# Patient Record
Sex: FEMALE | Race: WHITE | NOT HISPANIC OR LATINO | Employment: FULL TIME
[De-identification: names, ages, dates, MRNs, and addresses within clinical notes are randomized per-mention and may not be internally consistent; named-entity substitution may affect disease eponyms.]

---

## 2018-04-02 ENCOUNTER — TRANSCRIBE ORDERS (OUTPATIENT)
Dept: SCHEDULING | Age: 43
End: 2018-04-02

## 2018-04-02 DIAGNOSIS — Z12.39 SCREENING BREAST EXAMINATION: ICD-10-CM

## 2018-04-02 DIAGNOSIS — R92.30 BREAST DENSITY: Primary | ICD-10-CM

## 2018-04-02 DIAGNOSIS — R92.1 BREAST CALCIFICATION SEEN ON MAMMOGRAM: ICD-10-CM

## 2018-04-06 ENCOUNTER — TELEPHONE (OUTPATIENT)
Dept: FAMILY MEDICINE | Facility: CLINIC | Age: 43
End: 2018-04-06

## 2018-04-06 NOTE — TELEPHONE ENCOUNTER
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  Patient now on proton pump inhibitor and Diflucan.  She is concerned the proton pump inhibitor may have contributed to thrush.  She may give trial of Zantac twice a day and hold the proton pump inhibitor until she sees gastroenterology.  After seeing gastroenterology would check lab work to check immune status.  Patient agrees

## 2018-04-06 NOTE — TELEPHONE ENCOUNTER
Pt had recent endoscopy done and would like to discuss the results with you.   Pt requesting callback  on # [phone]    ** Pt would like to discuss with Dr. Ordoñez

## 2018-04-16 ENCOUNTER — HOSPITAL ENCOUNTER (OUTPATIENT)
Dept: RADIOLOGY | Age: 43
Discharge: HOME | End: 2018-04-16
Attending: OBSTETRICS & GYNECOLOGY
Payer: COMMERCIAL

## 2018-04-16 DIAGNOSIS — Z12.39 SCREENING BREAST EXAMINATION: ICD-10-CM

## 2018-04-16 DIAGNOSIS — R92.30 BREAST DENSITY: ICD-10-CM

## 2018-04-16 DIAGNOSIS — R92.1 BREAST CALCIFICATION SEEN ON MAMMOGRAM: ICD-10-CM

## 2018-04-16 PROCEDURE — 77066 DX MAMMO INCL CAD BI: CPT

## 2018-06-11 ENCOUNTER — TELEPHONE (OUTPATIENT)
Dept: FAMILY MEDICINE | Facility: CLINIC | Age: 43
End: 2018-06-11

## 2018-06-11 NOTE — TELEPHONE ENCOUNTER
RE STOMACH ISSUES/RECURRING ESOPHAGEAL INFECTION.  GI SUGGESTS SEEING AN INFECTIOUS DISEASE SPECIALIST. SHE WOULD LIKE TO KNOW YOUR THOUGHTS O THAT AND WHO YOU WOULD SUGGEST IF YOU AGREE THAT THIS IS THE BEST COURSE OF ACTION  BEST NUMBER:  [phone]

## 2018-06-13 ENCOUNTER — TELEPHONE (OUTPATIENT)
Dept: FAMILY MEDICINE | Facility: CLINIC | Age: 43
End: 2018-06-13

## 2018-06-13 NOTE — TELEPHONE ENCOUNTER
Pt has gi issues and recently had esophagitis.  Was advised b gi to see infectious disease.  Gave pt names at [facility].  Pt would still like to speak to you to see if you think she should go/mb

## 2018-06-15 NOTE — TELEPHONE ENCOUNTER
Agree with referral to infectious disease for recurrent candidal esophagitis.  Patient understands

## 2018-12-17 ENCOUNTER — OFFICE VISIT (OUTPATIENT)
Dept: FAMILY MEDICINE | Facility: CLINIC | Age: 43
End: 2018-12-17
Payer: COMMERCIAL

## 2018-12-17 VITALS
OXYGEN SATURATION: 98 % | RESPIRATION RATE: 12 BRPM | TEMPERATURE: 97.8 F | HEART RATE: 68 BPM | WEIGHT: 113.4 LBS | SYSTOLIC BLOOD PRESSURE: 126 MMHG | BODY MASS INDEX: 21.41 KG/M2 | HEIGHT: 61 IN | DIASTOLIC BLOOD PRESSURE: 82 MMHG

## 2018-12-17 DIAGNOSIS — R10.9 ABDOMINAL DISCOMFORT: Primary | ICD-10-CM

## 2018-12-17 PROCEDURE — 99213 OFFICE O/P EST LOW 20 MIN: CPT | Performed by: NURSE PRACTITIONER

## 2018-12-17 RX ORDER — VALACYCLOVIR HYDROCHLORIDE 1 G/1
TABLET, FILM COATED ORAL
COMMUNITY
Start: 2017-02-21 | End: 2019-02-20 | Stop reason: SDUPTHER

## 2018-12-17 RX ORDER — FEXOFENADINE HCL AND PSEUDOEPHEDRINE HCI 60; 120 MG/1; MG/1
TABLET, EXTENDED RELEASE ORAL
COMMUNITY
Start: 2016-04-05 | End: 2021-01-15

## 2018-12-17 RX ORDER — PANTOPRAZOLE SODIUM 40 MG/1
40 TABLET, DELAYED RELEASE ORAL
COMMUNITY
Start: 2017-02-07 | End: 2020-12-10 | Stop reason: ALTCHOICE

## 2018-12-17 ASSESSMENT — ENCOUNTER SYMPTOMS
CONSTIPATION: 0
CONSTITUTIONAL NEGATIVE: 1
HEMATURIA: 0
VOMITING: 0
DYSURIA: 0
ABDOMINAL PAIN: 1
FREQUENCY: 0
ROS GI COMMENTS: NO CHANGE IN APPETITE
DIARRHEA: 0
NAUSEA: 0

## 2018-12-17 NOTE — PROGRESS NOTES
Daily Progress Note      Subjective      Patient ID: Sandra Albrecht is a 43 y.o. female.    Past two days Notes slight lower abdominal discomfort off and on (no relation to urinating) and some slight burning? Has had UTIs in the past and doesn't note frequency, urgency or james dysuria, but wants checked-in past had progressed to pyelonephritis and doesn't want that to happen again  Denies fever, back pain, hematuria          The following have been reviewed and updated as appropriate in this visit:       Review of Systems   Constitutional: Negative.    Gastrointestinal: Positive for abdominal pain (mild lower abdominal discomfort wax and wane). Negative for constipation, diarrhea, nausea and vomiting.        No change in appetite   Genitourinary: Negative for dysuria, frequency, genital sores, hematuria, urgency, vaginal discharge and vaginal pain.        Notes slight lower abdominal discomfort which comes and goes w/o relation to urination  No flank pain       Current Outpatient Prescriptions   Medication Sig Dispense Refill   • fexofenadine-pseudoephedrine (ALLEGRA-D 12 HOUR)  mg per 12 hr tablet take 1 tablet by oral route 2 times every day     • ranitidine (ZANTAC) 150 mg tablet 150 mg.     • valACYclovir (VALTREX) 1 gram tablet TAKE 1 TABLET BY MOUTH EVERY 12 HOURS FOR 2 DAYS AT SYMPTOM ONSET.     • pantoprazole (PROTONIX) 40 mg EC tablet 40 mg.       No current facility-administered medications for this visit.      No past medical history on file.  No family history on file.  Past Surgical History:   Procedure Laterality Date   • UPPER GASTROINTESTINAL ENDOSCOPY  04/06/2018    Gastropathy     Social History     Social History   • Marital status:      Spouse name: N/A   • Number of children: N/A   • Years of education: N/A     Occupational History   • Not on file.     Social History Main Topics   • Smoking status: Not on file   • Smokeless tobacco: Not on file   • Alcohol use Not on file   • Drug  use: Unknown   • Sexual activity: Not on file     Other Topics Concern   • Not on file     Social History Narrative   • No narrative on file     Allergies   Allergen Reactions   • Aminoglycosides      Other reaction(s): UNKNOWN  neosporin rash    • Bacitracin      Other reaction(s): UNKNOWN  neosporin rash    • No Known Allergies        Objective       Physical Exam   Constitutional: She appears well-developed and well-nourished.   Abdominal: Soft. Bowel sounds are normal. There is no tenderness.   Genitourinary:   Genitourinary Comments: Urine dip negative leukocytes, nitrite, blood  Trace protein       Assessment/Plan     Problem List Items Addressed This Visit     None        No orders of the defined types were placed in this encounter.      PATRICIA Hanley  12/17/2018    Written education and action steps suggested to the patient are documented in the after visit summary/patient instruction section of this encounter

## 2018-12-17 NOTE — PATIENT INSTRUCTIONS
Will increase clear liquids, cranberry juice-if starts with increasing intensity f/u with office-will send urine off to lab for culture

## 2018-12-18 LAB
APPEARANCE UR: CLEAR
BACTERIA UR CULT: NORMAL
BILIRUB UR QL STRIP: NEGATIVE
COLOR UR: YELLOW
GLUCOSE UR QL STRIP: NEGATIVE
HGB UR QL STRIP: NEGATIVE
KETONES UR QL STRIP: NEGATIVE
LEUKOCYTE ESTERASE UR QL STRIP: NEGATIVE
NITRITE UR QL STRIP: NEGATIVE
PH UR STRIP: 6 [PH] (ref 5–8)
PROT UR QL STRIP: NEGATIVE
SP GR UR STRIP: 1.01 (ref 1–1.03)

## 2019-02-20 RX ORDER — VALACYCLOVIR HYDROCHLORIDE 1 G/1
TABLET, FILM COATED ORAL
Qty: 20 TABLET | Refills: 0 | Status: SHIPPED | OUTPATIENT
Start: 2019-02-20 | End: 2019-06-10 | Stop reason: SDUPTHER

## 2019-05-03 ENCOUNTER — TRANSCRIBE ORDERS (OUTPATIENT)
Dept: SCHEDULING | Age: 44
End: 2019-05-03

## 2019-05-03 DIAGNOSIS — Z12.31 ENCOUNTER FOR SCREENING MAMMOGRAM FOR MALIGNANT NEOPLASM OF BREAST: Primary | ICD-10-CM

## 2019-05-13 ENCOUNTER — HOSPITAL ENCOUNTER (OUTPATIENT)
Dept: RADIOLOGY | Age: 44
Discharge: HOME | End: 2019-05-13
Attending: NURSE PRACTITIONER
Payer: COMMERCIAL

## 2019-05-13 DIAGNOSIS — Z12.31 ENCOUNTER FOR SCREENING MAMMOGRAM FOR MALIGNANT NEOPLASM OF BREAST: ICD-10-CM

## 2019-05-13 PROCEDURE — 77067 SCR MAMMO BI INCL CAD: CPT

## 2019-06-10 RX ORDER — VALACYCLOVIR HYDROCHLORIDE 1 G/1
2000 TABLET, FILM COATED ORAL 2 TIMES DAILY
Qty: 20 TABLET | Refills: 0 | Status: SHIPPED | OUTPATIENT
Start: 2019-06-10 | End: 2020-11-10 | Stop reason: SDUPTHER

## 2019-06-10 NOTE — TELEPHONE ENCOUNTER
Medicine Refill Request    Last Office Visit: 12/17/2018  Next Office Visit: Visit date not found        Current Outpatient Prescriptions:   •  fexofenadine-pseudoephedrine (ALLEGRA-D 12 HOUR)  mg per 12 hr tablet, take 1 tablet by oral route 2 times every day, Disp: , Rfl:   •  pantoprazole (PROTONIX) 40 mg EC tablet, 40 mg., Disp: , Rfl:   •  ranitidine (ZANTAC) 150 mg tablet, 150 mg., Disp: , Rfl:   •  valACYclovir (VALTREX) 1 gram tablet, TAKE 1 TABLET BY MOUTH EVERY 12 HOURS FOR 2 DAYS AT SYMPTOM ONSET., Disp: 20 tablet, Rfl: 0      BP Readings from Last 3 Encounters:   12/17/18 126/82       Recent Lab results:  Lab Results   Component Value Date    CHOL 205 (H) 12/09/2017   ,   Lab Results   Component Value Date    HDL 99 12/09/2017   ,   Lab Results   Component Value Date    LDLCALC 93 12/09/2017   ,   Lab Results   Component Value Date    TRIG 50 12/09/2017        Lab Results   Component Value Date    GLUCOSE NEGATIVE 12/17/2018   , No results found for: HGBA1C      Lab Results   Component Value Date    CREATININE 0.73 12/09/2017       Lab Results   Component Value Date    TSH 0.93 12/09/2017

## 2019-07-16 ENCOUNTER — PATIENT OUTREACH (OUTPATIENT)
Dept: FAMILY MEDICINE | Facility: CLINIC | Age: 44
End: 2019-07-16

## 2019-07-16 NOTE — PROGRESS NOTES
Care Gap Team has outreached to Sandra Albrecht on behalf of their primary care provider.      Care Gap Source:: [facility] Gap Report         Care Gap Status:: Due    Outreach via:: Telephone    Adult Preventive Wellness Protocol(s) Used: : Cervical Cancer Screening    Chart Review Completed:: Yes  Patient interview completed:: No  Inclusion Criteria:: Met  Exclusion Criteria: None                    Appointment provided:: No                Called patient about being due for cervical cancer screening per [facility] report. LM on pt's VM asking to return my call at her earliest convenience.

## 2020-07-20 ENCOUNTER — TRANSCRIBE ORDERS (OUTPATIENT)
Dept: RADIOLOGY | Age: 45
End: 2020-07-20

## 2020-07-20 ENCOUNTER — HOSPITAL ENCOUNTER (OUTPATIENT)
Dept: RADIOLOGY | Age: 45
Discharge: HOME | End: 2020-07-20
Attending: OBSTETRICS & GYNECOLOGY
Payer: COMMERCIAL

## 2020-07-20 DIAGNOSIS — Z12.31 ENCOUNTER FOR SCREENING MAMMOGRAM FOR MALIGNANT NEOPLASM OF BREAST: ICD-10-CM

## 2020-07-20 DIAGNOSIS — Z12.31 ENCOUNTER FOR SCREENING MAMMOGRAM FOR MALIGNANT NEOPLASM OF BREAST: Primary | ICD-10-CM

## 2020-07-20 PROCEDURE — 77067 SCR MAMMO BI INCL CAD: CPT

## 2020-08-23 ENCOUNTER — TELEPHONE (OUTPATIENT)
Dept: FAMILY MEDICINE | Facility: CLINIC | Age: 45
End: 2020-08-23

## 2020-08-23 NOTE — TELEPHONE ENCOUNTER
On call note - sx started yesterday w/ ST, congestion in sinuses/ears, hx allergies; this am feels like has URI sx, burning at top of chest.  Wondering if should be tested.  [facility].  Pt low risk for expo to COVID-19.  Denies diarrhea, loss of taste//smell, SOB, cough, rash.  + sinus HA, some incr fatigue from sx.  Doesn't work in [employer] or for [employer].  Pt also has c-scopy scheduled on Wed.  Rec pt self quarantine x 10 days/3 days aferbile on no anti-pyretics, supp care and call GI center to see if would test prior to c-scopy either through our office or theirs.  Pt v/u

## 2020-08-24 ENCOUNTER — TELEPHONE (OUTPATIENT)
Dept: INFECTIOUS DISEASES | Facility: HOSPITAL | Age: 45
End: 2020-08-24

## 2020-08-24 ENCOUNTER — CLINICAL SUPPORT (OUTPATIENT)
Dept: OTHER | Facility: CLINIC | Age: 45
End: 2020-08-24
Attending: FAMILY MEDICINE
Payer: COMMERCIAL

## 2020-08-24 ENCOUNTER — TELEPHONE (OUTPATIENT)
Dept: FAMILY MEDICINE | Facility: CLINIC | Age: 45
End: 2020-08-24

## 2020-08-24 DIAGNOSIS — J02.9 SORE THROAT: Primary | ICD-10-CM

## 2020-08-24 DIAGNOSIS — J02.9 SORE THROAT: ICD-10-CM

## 2020-08-24 NOTE — PROGRESS NOTES
Patient was processed today at [facility] drive-up clinic.  Pt denies any sort of medical distress today.  After identifying the patient, a nasopharyngeal sample was obtained and placed in viral transport medium.  Pt was given a post-collection education sheet and encouraged to self-isolate until they receive the results.  Pt directed to [facility] website for [facility] Privacy Practices.  Sample sent to the lab noted on the order and requisition.  Pt was without additional questions or concerns and was dispositioned from the testing area to home.

## 2020-08-24 NOTE — TELEPHONE ENCOUNTER
Please schedule this patient for Covid 19 testing.  I have updated the patient's demographic information with the patient's contact information for scheduling.    Patient having symptoms?: yes  If yes, list symptoms:Sore throat    The provider will be placing the order in Epic: yes  If no, provider was directed to fax the order to [phone]: No    Ordering provider (First - Last): efrain Rodriguez  Provider Best Callback #: [phone]  Fax number (If provider wants results and does not use In Basket): inbas     This patient is a [employer] Employee: YES/NO/NA: No  If yes: Hospital/Facility & Unit/Department & Job Title:

## 2020-08-26 LAB — SARS-COV-2 RNA RESP QL NAA+PROBE: NOT DETECTED

## 2020-11-10 RX ORDER — VALACYCLOVIR HYDROCHLORIDE 1 G/1
2000 TABLET, FILM COATED ORAL 2 TIMES DAILY
Qty: 20 TABLET | Refills: 0 | Status: SHIPPED | OUTPATIENT
Start: 2020-11-10 | End: 2021-10-27 | Stop reason: SDUPTHER

## 2020-11-10 NOTE — TELEPHONE ENCOUNTER
Medicine Refill Request    Last Office Visit: 12/17/2018  Last Telemedicine Visit: Visit date not found    Next Office Visit: 12/10/2020  Next Telemedicine Visit: Visit date not found         Current Outpatient Medications:   •  fexofenadine-pseudoephedrine (ALLEGRA-D 12 HOUR)  mg per 12 hr tablet, take 1 tablet by oral route 2 times every day, Disp: , Rfl:   •  pantoprazole (PROTONIX) 40 mg EC tablet, 40 mg., Disp: , Rfl:   •  ranitidine (ZANTAC) 150 mg tablet, 150 mg., Disp: , Rfl:   •  valACYclovir (VALTREX) 1 gram tablet, Take 2 tablets (2,000 mg total) by mouth 2 (two) times a day for 1 day., Disp: 20 tablet, Rfl: 0      BP Readings from Last 3 Encounters:   12/17/18 126/82       Recent Lab results:  Lab Results   Component Value Date    CHOL 205 (H) 12/09/2017   ,   Lab Results   Component Value Date    HDL 99 12/09/2017   ,   Lab Results   Component Value Date    LDLCALC 93 12/09/2017   ,   Lab Results   Component Value Date    TRIG 50 12/09/2017        Lab Results   Component Value Date    GLUCOSE NEGATIVE 12/17/2018   , No results found for: HGBA1C      Lab Results   Component Value Date    CREATININE 0.73 12/09/2017       Lab Results   Component Value Date    TSH 0.93 12/09/2017

## 2020-12-10 ENCOUNTER — OFFICE VISIT (OUTPATIENT)
Dept: FAMILY MEDICINE | Facility: CLINIC | Age: 45
End: 2020-12-10
Payer: COMMERCIAL

## 2020-12-10 VITALS
BODY MASS INDEX: 21.52 KG/M2 | TEMPERATURE: 98.2 F | DIASTOLIC BLOOD PRESSURE: 78 MMHG | RESPIRATION RATE: 12 BRPM | HEART RATE: 84 BPM | HEIGHT: 61 IN | WEIGHT: 114 LBS | SYSTOLIC BLOOD PRESSURE: 118 MMHG | OXYGEN SATURATION: 95 %

## 2020-12-10 DIAGNOSIS — R07.89 CHEST WALL PAIN: ICD-10-CM

## 2020-12-10 DIAGNOSIS — J30.1 SEASONAL ALLERGIC RHINITIS DUE TO POLLEN: ICD-10-CM

## 2020-12-10 DIAGNOSIS — H40.013 OPEN ANGLE WITH BORDERLINE FINDINGS, LOW RISK, BILATERAL: ICD-10-CM

## 2020-12-10 DIAGNOSIS — Z00.00 ENCOUNTER FOR GENERAL ADULT MEDICAL EXAMINATION WITHOUT ABNORMAL FINDINGS: Primary | ICD-10-CM

## 2020-12-10 DIAGNOSIS — K58.9 IRRITABLE BOWEL SYNDROME, UNSPECIFIED TYPE: ICD-10-CM

## 2020-12-10 PROBLEM — R92.1 MAMMOGRAPHIC CALCIFICATION FOUND ON DIAGNOSTIC IMAGING OF BREAST: Status: ACTIVE | Noted: 2017-09-28

## 2020-12-10 PROCEDURE — 99396 PREV VISIT EST AGE 40-64: CPT | Performed by: FAMILY MEDICINE

## 2020-12-10 RX ORDER — TRETINOIN 0.5 MG/G
CREAM TOPICAL
COMMUNITY
Start: 2020-09-03

## 2020-12-10 RX ORDER — MONTELUKAST SODIUM 10 MG/1
10 TABLET ORAL NIGHTLY
Qty: 30 TABLET | Refills: 1 | Status: SHIPPED | OUTPATIENT
Start: 2020-12-10 | End: 2022-11-07

## 2020-12-10 ASSESSMENT — ENCOUNTER SYMPTOMS
WHEEZING: 0
ARTHRALGIAS: 0
ACTIVITY CHANGE: 0
TROUBLE SWALLOWING: 0
FATIGUE: 0
BACK PAIN: 0
DIARRHEA: 0
UNEXPECTED WEIGHT CHANGE: 0
EYE REDNESS: 0
WEAKNESS: 0
DYSURIA: 0
HEMATURIA: 0
NAUSEA: 0
SINUS PRESSURE: 0
SINUS PAIN: 0
VOMITING: 0
PALPITATIONS: 0
APPETITE CHANGE: 0
NERVOUS/ANXIOUS: 0
NUMBNESS: 0
SHORTNESS OF BREATH: 0
CONSTIPATION: 0
ABDOMINAL PAIN: 0
SLEEP DISTURBANCE: 0
SORE THROAT: 0

## 2020-12-10 NOTE — PROGRESS NOTES
"      Subjective      Patient ID: Sandra Albrecht is a 45 y.o. female.  1975      Patient annual exam.  No new medical complaints today.  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  She has 2 kids high school middle school age  Patient typically has 3 meals per day.  She does exercise on a regular basis.  Patient has had recent chest wall pain she was seen by gynecology.  Breast pain.  No trauma but there is soreness every time she touches it.  Patient also with allergy symptoms.  She does not tolerate steroid nasal spray because of candidal esophagitis.  Patient followed by ophthalmology for glaucoma suspect         The following have been reviewed and updated as appropriate in this visit:  Tobacco  Fam Hx  Soc Hx      Review of Systems   Constitutional: Negative for activity change, appetite change, fatigue and unexpected weight change.   HENT: Negative for ear pain, sinus pressure, sinus pain, sore throat and trouble swallowing.    Eyes: Negative for redness and visual disturbance.   Respiratory: Negative for shortness of breath and wheezing.    Cardiovascular: Negative for chest pain and palpitations.   Gastrointestinal: Negative for abdominal pain, constipation, diarrhea, nausea and vomiting.   Genitourinary: Negative for dysuria and hematuria.   Musculoskeletal: Negative for arthralgias, back pain and gait problem.   Skin: Negative for rash.   Neurological: Negative for weakness and numbness.   Psychiatric/Behavioral: Negative for sleep disturbance. The patient is not nervous/anxious.        Objective     Vitals:    12/10/20 1518   BP: 118/78   BP Location: Left upper arm   Patient Position: Sitting   Pulse: 84   Resp: 12   Temp: 36.8 °C (98.2 °F)   TempSrc: Temporal   SpO2: 95%   Weight: 51.7 kg (114 lb)   Height: 1.549 m (5' 1\")     Body mass index is 21.54 kg/m².    Physical Exam  Vitals signs reviewed.   Constitutional:       General: She is not in acute distress.     Appearance: She is " well-developed.   HENT:      Head: Normocephalic and atraumatic.      Right Ear: Tympanic membrane, ear canal and external ear normal.      Left Ear: Tympanic membrane, ear canal and external ear normal.      Nose: Nose normal.   Eyes:      Conjunctiva/sclera: Conjunctivae normal.   Neck:      Musculoskeletal: Normal range of motion and neck supple.      Thyroid: No thyromegaly.   Cardiovascular:      Rate and Rhythm: Normal rate and regular rhythm.      Heart sounds: Normal heart sounds. No murmur.   Pulmonary:      Effort: Pulmonary effort is normal.      Breath sounds: Normal breath sounds. No wheezing.   Abdominal:      General: Bowel sounds are normal. There is no distension.      Palpations: Abdomen is soft. There is no mass.   Musculoskeletal: Normal range of motion.         General: No deformity.   Lymphadenopathy:      Cervical: No cervical adenopathy.   Skin:     General: Skin is warm and dry.      Findings: No rash.   Neurological:      Mental Status: She is alert and oriented to person, place, and time.      Motor: No abnormal muscle tone.      Coordination: Coordination normal.         Assessment/Plan   Diagnoses and all orders for this visit:    Encounter for general adult medical examination without abnormal findings (Primary)  -     Comprehensive metabolic panel; Future  -     Lipid panel; Future  -     CBC; Future  -     Urinalysis with microscopic; Future    Seasonal allergic rhinitis due to pollen  -     montelukast (SINGULAIR) 10 mg tablet; Take 1 tablet (10 mg total) by mouth nightly.    Irritable bowel syndrome, unspecified type    Open angle with borderline findings, low risk, bilateral    Chest wall pain    Annual exam.  Daily routine discussed at length.  Recommend 3 meals per day no snacking.  Patient continue to limit caffeine and alcohol.  Patient continue exercise program.  Allergic rhinitis.  Patient has tried multiple medication we will add Singulair to see if this helps.  Patient  understands  Irritable bowel syndrome.  Intermittent symptoms.  Patient to maintain consistent routine.  Borderline pressures.  Patient followed by ophthalmology.  Chest wall pain.  Will have patient give trial of ibuprofen 3 times a day for 1 to 2 weeks.

## 2020-12-22 ENCOUNTER — TRANSCRIBE ORDERS (OUTPATIENT)
Dept: SCHEDULING | Age: 45
End: 2020-12-22

## 2020-12-22 DIAGNOSIS — N64.4 MASTODYNIA: Primary | ICD-10-CM

## 2021-01-04 ENCOUNTER — HOSPITAL ENCOUNTER (OUTPATIENT)
Dept: RADIOLOGY | Age: 46
Discharge: HOME | End: 2021-01-04
Attending: OBSTETRICS & GYNECOLOGY
Payer: COMMERCIAL

## 2021-01-04 DIAGNOSIS — N64.4 MASTODYNIA: ICD-10-CM

## 2021-01-04 PROCEDURE — 77065 DX MAMMO INCL CAD UNI: CPT | Mod: LT

## 2021-01-04 PROCEDURE — 76642 ULTRASOUND BREAST LIMITED: CPT | Mod: LT

## 2021-01-13 ENCOUNTER — TELEPHONE (OUTPATIENT)
Dept: FAMILY MEDICINE | Facility: CLINIC | Age: 46
End: 2021-01-13

## 2021-01-15 ENCOUNTER — OFFICE VISIT (OUTPATIENT)
Dept: FAMILY MEDICINE | Facility: CLINIC | Age: 46
End: 2021-01-15
Payer: COMMERCIAL

## 2021-01-15 VITALS
OXYGEN SATURATION: 99 % | SYSTOLIC BLOOD PRESSURE: 114 MMHG | DIASTOLIC BLOOD PRESSURE: 76 MMHG | BODY MASS INDEX: 22.05 KG/M2 | TEMPERATURE: 96.2 F | WEIGHT: 116.8 LBS | HEIGHT: 61 IN | HEART RATE: 96 BPM | RESPIRATION RATE: 14 BRPM

## 2021-01-15 DIAGNOSIS — I73.00 RAYNAUD'S DISEASE WITHOUT GANGRENE: Primary | ICD-10-CM

## 2021-01-15 PROCEDURE — 99213 OFFICE O/P EST LOW 20 MIN: CPT | Performed by: FAMILY MEDICINE

## 2021-01-15 RX ORDER — CETIRIZINE HYDROCHLORIDE 10 MG/1
10 TABLET ORAL DAILY
Qty: 90 TABLET | Refills: 1
Start: 2021-01-15 | End: 2025-03-12

## 2021-01-15 NOTE — PROGRESS NOTES
"      Subjective      Patient ID: Sandra Albrecht is a 45 y.o. female.  1975      HPI    Here for foot numbness. Red hot, swollen. No painful. Tried ice. Happened after a walk. On left sole of foot. Swollen foot. Went to . Was told it was a btuise.  Looked like a burst blood vessel.no pain at all.now looks like a  Bruise. Sometimes gets white and has no feeling. Has tried putting it in wtre. Turning red, whte and blue over the last 2 days.  Thinks related to exposure to the cold.  Same shoes--sneaker.  Just on left foot. Pinky toe toe loks white. Supposed to go skiing tomorrow.  No hx of frost bike.  Has a hx of raynauds in  Her hands.     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   The following have been reviewed and updated as appropriate in this visit:    Current Outpatient Medications:   •  montelukast (SINGULAIR) 10 mg tablet, Take 1 tablet (10 mg total) by mouth nightly., Disp: 30 tablet, Rfl: 1  •  tretinoin (RETIN-A) 0.05 % cream, APPLY TO FACE EVERY EVENING, Disp: , Rfl:   •  valACYclovir (VALTREX) 1 gram tablet, Take 2 tablets (2,000 mg total) by mouth 2 (two) times a day for 1 day., Disp: 20 tablet, Rfl: 0  Allergies   Allergen Reactions   • Aminoglycosides      Other reaction(s): UNKNOWN  neosporin rash    • Bacitracin      Other reaction(s): UNKNOWN  neosporin rash    • Polymyxin B Sulfate Other (see comments)            Review of Systems    Objective     Vitals:    01/15/21 1040   BP: 114/76   BP Location: Left upper arm   Patient Position: Sitting   Pulse: 96   Resp: 14   Temp: (!) 35.7 °C (96.2 °F)   TempSrc: Temporal   SpO2: 99%   Weight: 53 kg (116 lb 12.8 oz)   Height: 1.549 m (5' 1\")     Body mass index is 22.07 kg/m².    Physical Exam  Vitals signs and nursing note reviewed.   Constitutional:       Appearance: She is well-developed. She is not diaphoretic.   HENT:      Head: Normocephalic and atraumatic.      Right Ear: External ear normal.      Left Ear: External ear normal.      Nose: Nose " normal.   Eyes:      Conjunctiva/sclera: Conjunctivae normal.      Pupils: Pupils are equal, round, and reactive to light.   Neck:      Musculoskeletal: Normal range of motion and neck supple.   Cardiovascular:      Rate and Rhythm: Normal rate and regular rhythm.      Heart sounds: Normal heart sounds. No murmur. No friction rub. No gallop.    Pulmonary:      Effort: Pulmonary effort is normal. No respiratory distress.      Breath sounds: Normal breath sounds. No wheezing or rales.   Abdominal:      General: Bowel sounds are normal.      Palpations: Abdomen is soft.   Musculoskeletal: Normal range of motion.   Skin:     General: Skin is warm and dry.      Comments: Cool fingers and toes. Pallor of toes. Blanching reddish skin lateral left foot dorsum and lateral plantar aspect of foot   Neurological:      Mental Status: She is alert and oriented to person, place, and time.   Psychiatric:         Behavior: Behavior normal.         Thought Content: Thought content normal.         Judgment: Judgment normal.         Assessment/Plan   Diagnoses and all orders for this visit:    Raynaud's disease without gangrene (Primary)  Assessment & Plan:  rec avoiding extremes of temp  Foot warmers on top of foot when skiing tomorrow  Take frequent breaks when in cold  rto for worsening sx  Avoid sudafed  rec hypertonic nasal saline for sinus  Consider CCB in the future, if BP will tolerate      Other orders  -     cetirizine (ZyrTEC) 10 mg tablet; Take 1 tablet (10 mg total) by mouth daily.

## 2021-01-15 NOTE — ASSESSMENT & PLAN NOTE
rec avoiding extremes of temp  Foot warmers on top of foot when skiing tomorrow  Take frequent breaks when in cold  rto for worsening sx  Avoid sudafed  rec hypertonic nasal saline for sinus  Consider CCB in the future, if BP will tolerate

## 2021-01-28 LAB
ALBUMIN SERPL-MCNC: 4.2 G/DL (ref 3.6–5.1)
ALBUMIN/GLOB SERPL: 1.8 (CALC) (ref 1–2.5)
ALP SERPL-CCNC: 50 U/L (ref 31–125)
ALT SERPL-CCNC: 9 U/L (ref 6–29)
APPEARANCE UR: CLEAR
AST SERPL-CCNC: 18 U/L (ref 10–35)
BACTERIA #/AREA URNS HPF: NORMAL /HPF
BILIRUB SERPL-MCNC: 0.5 MG/DL (ref 0.2–1.2)
BILIRUB UR QL STRIP: NEGATIVE
BUN SERPL-MCNC: 11 MG/DL (ref 7–25)
BUN/CREAT SERPL: ABNORMAL (CALC) (ref 6–22)
CALCIUM SERPL-MCNC: 9.4 MG/DL (ref 8.6–10.2)
CHLORIDE SERPL-SCNC: 99 MMOL/L (ref 98–110)
CHOLEST SERPL-MCNC: 203 MG/DL
CHOLEST/HDLC SERPL: 2.1 (CALC)
CO2 SERPL-SCNC: 27 MMOL/L (ref 20–32)
COLOR UR: YELLOW
CREAT SERPL-MCNC: 0.71 MG/DL (ref 0.5–1.1)
ERYTHROCYTE [DISTWIDTH] IN BLOOD BY AUTOMATED COUNT: 12 % (ref 11–15)
GLOBULIN SER CALC-MCNC: 2.4 G/DL (CALC) (ref 1.9–3.7)
GLUCOSE SERPL-MCNC: 81 MG/DL (ref 65–99)
GLUCOSE UR QL STRIP: NEGATIVE
HCT VFR BLD AUTO: 39.9 % (ref 35–45)
HDLC SERPL-MCNC: 95 MG/DL
HGB BLD-MCNC: 13.2 G/DL (ref 11.7–15.5)
HGB UR QL STRIP: NEGATIVE
HYALINE CASTS #/AREA URNS LPF: NORMAL /LPF
KETONES UR QL STRIP: NEGATIVE
LDLC SERPL CALC-MCNC: 93 MG/DL (CALC)
LEUKOCYTE ESTERASE UR QL STRIP: NEGATIVE
MCH RBC QN AUTO: 30.1 PG (ref 27–33)
MCHC RBC AUTO-ENTMCNC: 33.1 G/DL (ref 32–36)
MCV RBC AUTO: 90.9 FL (ref 80–100)
NITRITE UR QL STRIP: NEGATIVE
NONHDLC SERPL-MCNC: 108 MG/DL (CALC)
PH UR STRIP: 7.5 [PH] (ref 5–8)
PLATELET # BLD AUTO: 308 THOUSAND/UL (ref 140–400)
PMV BLD REES-ECKER: 11 FL (ref 7.5–12.5)
POTASSIUM SERPL-SCNC: 4.5 MMOL/L (ref 3.5–5.3)
PROT SERPL-MCNC: 6.6 G/DL (ref 6.1–8.1)
PROT UR QL STRIP: NEGATIVE
QUEST EGFR NON-AFR. AMERICAN: 102 ML/MIN/1.73M2
RBC # BLD AUTO: 4.39 MILLION/UL (ref 3.8–5.1)
RBC #/AREA URNS HPF: NORMAL /HPF
SODIUM SERPL-SCNC: 133 MMOL/L (ref 135–146)
SP GR UR STRIP: 1 (ref 1–1.03)
SQUAMOUS #/AREA URNS HPF: NORMAL /HPF
TRIGL SERPL-MCNC: 60 MG/DL
WBC # BLD AUTO: 5.9 THOUSAND/UL (ref 3.8–10.8)
WBC #/AREA URNS HPF: NORMAL /HPF

## 2021-02-04 ENCOUNTER — TELEPHONE (OUTPATIENT)
Dept: FAMILY MEDICINE | Facility: CLINIC | Age: 46
End: 2021-02-04

## 2021-02-11 ENCOUNTER — IMMUNIZATIONS (OUTPATIENT)
Dept: FAMILY MEDICINE CLINIC | Facility: HOSPITAL | Age: 46
End: 2021-02-11

## 2021-02-11 DIAGNOSIS — Z23 ENCOUNTER FOR IMMUNIZATION: Primary | ICD-10-CM

## 2021-02-11 PROCEDURE — 0001A SARS-COV-2 / COVID-19 MRNA VACCINE (PFIZER-BIONTECH) 30 MCG: CPT

## 2021-02-11 PROCEDURE — 91300 SARS-COV-2 / COVID-19 MRNA VACCINE (PFIZER-BIONTECH) 30 MCG: CPT

## 2021-02-17 ENCOUNTER — OFFICE VISIT (OUTPATIENT)
Dept: FAMILY MEDICINE | Facility: CLINIC | Age: 46
End: 2021-02-17
Payer: COMMERCIAL

## 2021-02-17 VITALS
HEART RATE: 85 BPM | BODY MASS INDEX: 21.34 KG/M2 | DIASTOLIC BLOOD PRESSURE: 72 MMHG | HEIGHT: 61 IN | RESPIRATION RATE: 18 BRPM | TEMPERATURE: 97.8 F | OXYGEN SATURATION: 99 % | WEIGHT: 113 LBS | SYSTOLIC BLOOD PRESSURE: 112 MMHG

## 2021-02-17 DIAGNOSIS — R07.89 CHEST WALL PAIN: Primary | ICD-10-CM

## 2021-02-17 PROCEDURE — 99213 OFFICE O/P EST LOW 20 MIN: CPT | Performed by: FAMILY MEDICINE

## 2021-02-18 ASSESSMENT — ENCOUNTER SYMPTOMS
SHORTNESS OF BREATH: 0
EYE DISCHARGE: 0
WHEEZING: 0
CHILLS: 0
SINUS PAIN: 0
PALPITATIONS: 0
VOICE CHANGE: 0
NAUSEA: 0
VOMITING: 0
EYE PAIN: 0
FEVER: 0
SORE THROAT: 0
SINUS PRESSURE: 0

## 2021-02-18 NOTE — PROGRESS NOTES
"      Subjective      Patient ID: Sandra Albrecht is a 45 y.o. female.  1975      Patient complaining of left side breast/chest pain.  Patient was seen by gynecology.  She had a mammogram and ultrasound and these were normal.  Patient notes no trauma but does have a soreness to area with palpation.  Patient is taking occasional Tylenol and Advil.      The following have been reviewed and updated as appropriate in this visit:       Review of Systems   Constitutional: Negative for chills and fever.   HENT: Negative for ear pain, sinus pressure, sinus pain, sore throat and voice change.    Eyes: Negative for pain and discharge.   Respiratory: Negative for shortness of breath and wheezing.    Cardiovascular: Positive for chest pain. Negative for palpitations.   Gastrointestinal: Negative for nausea and vomiting.   Skin: Negative for rash.       Objective     Vitals:    02/17/21 1231   BP: 112/72   Pulse: 85   Resp: 18   Temp: 36.6 °C (97.8 °F)   TempSrc: Temporal   SpO2: 99%   Weight: 51.3 kg (113 lb)   Height: 1.549 m (5' 1\")     Body mass index is 21.35 kg/m².    Physical Exam  Vitals signs reviewed.   Constitutional:       General: She is not in acute distress.     Appearance: She is not diaphoretic.   HENT:      Right Ear: Tympanic membrane, ear canal and external ear normal.      Left Ear: Tympanic membrane, ear canal and external ear normal.      Nose: No rhinorrhea.      Right Sinus: No maxillary sinus tenderness or frontal sinus tenderness.      Left Sinus: No maxillary sinus tenderness or frontal sinus tenderness.   Eyes:      Conjunctiva/sclera: Conjunctivae normal.   Neck:      Musculoskeletal: Neck supple.      Thyroid: No thyromegaly.   Cardiovascular:      Rate and Rhythm: Normal rate and regular rhythm.   Pulmonary:      Effort: Pulmonary effort is normal. No respiratory distress.      Breath sounds: Normal breath sounds. No wheezing or rales.      Comments: Chest tenderness along approximately sixth " rib.  Chest:      Chest wall: Tenderness present.   Lymphadenopathy:      Cervical: No cervical adenopathy.         Assessment/Plan   Diagnoses and all orders for this visit:    Chest wall pain (Primary)  -     X-RAY RIBS LEFT 2 VIEWS; Future    Chest wall pain.  Will have patient get rib series.  We will also have patient start ibuprofen over-the-counter 2 tablets 3 times a day for 1 to 2 weeks.  Patient may apply heat.  Patient understands

## 2021-02-19 ENCOUNTER — HOSPITAL ENCOUNTER (OUTPATIENT)
Dept: RADIOLOGY | Age: 46
Discharge: HOME | End: 2021-02-19
Attending: FAMILY MEDICINE
Payer: COMMERCIAL

## 2021-02-19 DIAGNOSIS — R07.89 CHEST WALL PAIN: ICD-10-CM

## 2021-02-19 PROCEDURE — 71100 X-RAY EXAM RIBS UNI 2 VIEWS: CPT | Mod: LT

## 2021-03-04 ENCOUNTER — IMMUNIZATIONS (OUTPATIENT)
Dept: FAMILY MEDICINE CLINIC | Facility: HOSPITAL | Age: 46
End: 2021-03-04

## 2021-03-04 DIAGNOSIS — Z23 ENCOUNTER FOR IMMUNIZATION: Primary | ICD-10-CM

## 2021-03-04 PROCEDURE — 91300 SARS-COV-2 / COVID-19 MRNA VACCINE (PFIZER-BIONTECH) 30 MCG: CPT

## 2021-03-04 PROCEDURE — 0002A SARS-COV-2 / COVID-19 MRNA VACCINE (PFIZER-BIONTECH) 30 MCG: CPT

## 2021-03-15 ENCOUNTER — DOCUMENTATION (OUTPATIENT)
Dept: RADIOLOGY | Age: 46
End: 2021-03-15

## 2021-03-19 ASSESSMENT — ENCOUNTER SYMPTOMS
CHILLS: 0
STRIDOR: 0
COUGH: 0
COLOR CHANGE: 0
NAUSEA: 0
NUMBNESS: 0
PALPITATIONS: 0
BRUISES/BLEEDS EASILY: 0
SORE THROAT: 0
MYALGIAS: 0
FREQUENCY: 0
DIARRHEA: 0
FLANK PAIN: 0
NERVOUS/ANXIOUS: 0
WOUND: 0
DIFFICULTY URINATING: 0
SINUS PRESSURE: 0
ABDOMINAL PAIN: 0
DYSURIA: 0
BACK PAIN: 0
SEIZURES: 0
WHEEZING: 0
RECTAL PAIN: 0
FACIAL SWELLING: 0
HEADACHES: 0
VOICE CHANGE: 0
APPETITE CHANGE: 0
ACTIVITY CHANGE: 0
ANAL BLEEDING: 0
SHORTNESS OF BREATH: 0
WEAKNESS: 0
FEVER: 0
ABDOMINAL DISTENTION: 0
CHEST TIGHTNESS: 0
APNEA: 0
JOINT SWELLING: 0
DIZZINESS: 0
VOMITING: 0
DIAPHORESIS: 0
CONSTIPATION: 0
ARTHRALGIAS: 0
HEMATURIA: 0
UNEXPECTED WEIGHT CHANGE: 0
BLOOD IN STOOL: 0
ADENOPATHY: 0
TROUBLE SWALLOWING: 0
RHINORRHEA: 0
CHOKING: 0
FATIGUE: 0

## 2021-03-19 NOTE — PROGRESS NOTES
NEW PATIENT BREAST SURGICAL CONSULTATION    This is the first visit for this 45 y.o. female referred by Connor Morales MD for left breast mastalgia since November 2020.    History of Present Illness    Sandra Albrecht is a 45 y.o. female with no family history of breast cancer.  She underwent bilateral breast screening mammogram in July 2020 demonstrating dense breast tissue, and no mammographic evidence of malignancy.  She reported pain within her left breast at the 11 to 11:30 position, 6 cm from the nipple back in November 2020.  She describes the pain as noncyclical, at the 11 to 12 o'clock position of the left breast, 6 cm from the nipple, intermittent, electric, and sharp.  She does workout regularly, but has not been doing more chest exercises than normal.  She underwent left diagnostic mammography with left targeted US on 1/4/2021 which showed no suspicious masses or sonographic abnormalities.  It is not currently bothering her, however a week ago she did have 3 days in a row where she felt this pain with movement.  She has tried ibuprofen to see if this would improve the pain, and it only minimally helped.    Otherwise, the patient has no breast-specific or systemic complaints.    Problem List:   Patient Active Problem List   Diagnosis   • Allergic rhinitis due to pollen   • Herpes simplex virus (HSV) infection   • Irritable bowel syndrome   • Mammographic calcification found on diagnostic imaging of breast   • Open angle with borderline findings, low risk, bilateral   • Raynaud's disease without gangrene   • Mastalgia     Past Medical History:  Past Medical History:   Diagnosis Date   • Mastalgia 3/23/2021     Past Surgical History:  Past Surgical History:   Procedure Laterality Date   • COLONOSCOPY  10/01/2020   • UPPER GASTROINTESTINAL ENDOSCOPY  04/06/2018    Gastropathy     Social History:  Social History     Tobacco Use   • Smoking status: Never Smoker   • Smokeless tobacco: Never Used   Substance Use  Topics   • Alcohol use: Yes     Frequency: 2-3 times a week   • Drug use: Not on file     Family History:  Family History   Problem Relation Age of Onset   • Hypertension Biological Mother    • Hypertension Biological Father    • Colon cancer Maternal Grandmother      Allergies:  Allergies   Allergen Reactions   • Aminoglycosides      Other reaction(s): UNKNOWN  neosporin rash    • Bacitracin      Other reaction(s): UNKNOWN  neosporin rash    • Polymyxin B Sulfate Other (see comments)     Medications:    Current Outpatient Medications:   •  cetirizine (ZyrTEC) 10 mg tablet, Take 1 tablet (10 mg total) by mouth daily., Disp: 90 tablet, Rfl: 1  •  famotidine (PEPCID) 10 mg tablet, Take 10 mg by mouth 2 (two) times a day., Disp: , Rfl:   •  tretinoin (RETIN-A) 0.05 % cream, APPLY TO FACE EVERY EVENING, Disp: , Rfl:   •  montelukast (SINGULAIR) 10 mg tablet, Take 1 tablet (10 mg total) by mouth nightly. (Patient taking differently: Take 10 mg by mouth as needed.  ), Disp: 30 tablet, Rfl: 1  •  valACYclovir (VALTREX) 1 gram tablet, Take 2 tablets (2,000 mg total) by mouth 2 (two) times a day for 1 day. (Patient not taking: Reported on 2021 ), Disp: 20 tablet, Rfl: 0    Obstetric and Gynecologic History  Age at menarche: 11  Age at first live birth: 30   .   Age at menopause: No age on file  Breastfeeding? yes, 6 months  HRT: no  Fertility Tx: no  OCP: yes, age 18-30     Review of Systems:  Review of Systems   Constitutional: Negative for activity change, appetite change, chills, diaphoresis, fatigue, fever and unexpected weight change.   HENT: Negative for congestion, facial swelling, hearing loss, rhinorrhea, sinus pressure, sneezing, sore throat, trouble swallowing and voice change.    Eyes: Negative for visual disturbance.   Respiratory: Negative for apnea, cough, choking, chest tightness, shortness of breath, wheezing and stridor.    Cardiovascular: Negative for chest pain, palpitations and leg swelling.    Gastrointestinal: Negative for abdominal distention, abdominal pain, anal bleeding, blood in stool, constipation, diarrhea, nausea, rectal pain and vomiting.   Endocrine: Negative for polyuria.   Genitourinary: Negative for decreased urine volume, difficulty urinating, dysuria, flank pain, frequency, hematuria and urgency.   Musculoskeletal: Negative for arthralgias, back pain, gait problem, joint swelling and myalgias.   Skin: Negative for color change, pallor, rash and wound.   Allergic/Immunologic: Positive for environmental allergies. Negative for immunocompromised state.   Neurological: Negative for dizziness, seizures, syncope, weakness, numbness and headaches.   Hematological: Negative for adenopathy. Does not bruise/bleed easily.   Psychiatric/Behavioral: The patient is not nervous/anxious.      Physical Examination:  Vitals:    03/23/21 0953   BP: 118/72   Pulse: 80   Temp: 37.1 °C (98.8 °F)   SpO2: 98%     Physical Exam   Constitutional: She is oriented to person, place, and time. She appears well-developed and well-nourished. No distress.   HENT:   Head: Normocephalic and atraumatic.   Nose: Nose normal.   Mouth/Throat: Oropharynx is clear and moist.   Eyes: No scleral icterus.   Neck: Normal range of motion. Neck supple. No JVD present. No tracheal deviation present. No thyromegaly present.   Cardiovascular: Normal rate, regular rhythm, normal heart sounds and intact distal pulses. Exam reveals no gallop and no friction rub.   No murmur heard.  Pulmonary/Chest: Effort normal and breath sounds normal. No stridor. No respiratory distress. She has no wheezes. She has no rales. She exhibits no tenderness. Right breast exhibits no inverted nipple, no mass, no nipple discharge, no skin change and no tenderness. Left breast exhibits no inverted nipple, no mass, no nipple discharge, no skin change and no tenderness. Breasts are symmetrical.   Heterogeneously dense breast tissue   Abdominal: Soft. Bowel sounds  are normal. She exhibits no distension and no mass. There is no abdominal tenderness. There is no rebound and no guarding.   Musculoskeletal: Normal range of motion.         General: No tenderness, deformity or edema.   Lymphadenopathy:        Head (right side): No preauricular and no posterior auricular adenopathy present.        Head (left side): No preauricular and no posterior auricular adenopathy present.     She has no cervical adenopathy.        Right axillary: No pectoral and no lateral adenopathy present.        Left axillary: No pectoral and no lateral adenopathy present.  Neurological: She is alert and oriented to person, place, and time.   Skin: Skin is warm and dry. No rash noted. She is not diaphoretic. No erythema. No pallor. Nails show no clubbing.   Psychiatric: She has a normal mood and affect.   Nursing note and vitals reviewed.    Imaging Review:  IMAGING: I have personally reviewed the studies and their interpretation, which we rely upon.     Left diagnostic mammography with left targeted US on 1/4/2021 which showed no suspicious masses or sonographic abnormalities.    Bilateral breast screening mammogram in July 2020 demonstrating dense breast tissue, and no mammographic evidence of malignancy.     ----------------------------------------------    IMPRESSION: 45-year-old female with left breast mastalgia since November 2020    RECOMMENDATIONS:    I reviewed with Sandra Albrecht the results of her bilateral breast screening mammography that she underwent in July 2020 demonstrating no mammographic evidence of malignancy, and left breast diagnostic mammography as well as left targeted ultrasound demonstrating no abnormalities in the area of pain at the 11 o'clock position, 6 cm from the nipple.  She knows that she will be due for bilateral breast mammography in July 2021, and this is ordered every year by Dr. Morales.    We discussed interventions that she can do to decrease her bilateral breast  mastalgia such as compresses, breast massage, decreasing her caffeine intake, ensuring that she has a proper fitting bra, and a trial of evening primrose oil.     She has been informed if the left breast mastalgia does not improve, to come back for repeat evaluation.    I spent 40 minutes on this date of service performing the following activities: obtaining history, performing examination, documenting, preparing for visit, obtaining / reviewing records, providing counseling and education, independently reviewing study/studies, communicating results and coordinating care.     All of her questions were answered.  The patient is in agreement with the treatment plan.  She knows to call with any questions or concerns.

## 2021-03-23 ENCOUNTER — OFFICE VISIT (OUTPATIENT)
Dept: SURGERY | Facility: CLINIC | Age: 46
End: 2021-03-23
Payer: COMMERCIAL

## 2021-03-23 VITALS
OXYGEN SATURATION: 98 % | WEIGHT: 114 LBS | DIASTOLIC BLOOD PRESSURE: 72 MMHG | HEART RATE: 80 BPM | TEMPERATURE: 98.8 F | SYSTOLIC BLOOD PRESSURE: 118 MMHG | HEIGHT: 61 IN | BODY MASS INDEX: 21.52 KG/M2

## 2021-03-23 DIAGNOSIS — N64.4 MASTALGIA: Primary | ICD-10-CM

## 2021-03-23 PROCEDURE — 99203 OFFICE O/P NEW LOW 30 MIN: CPT | Performed by: SURGERY

## 2021-03-23 RX ORDER — FAMOTIDINE 10 MG/1
10 TABLET ORAL 2 TIMES DAILY PRN
COMMUNITY

## 2021-03-23 SDOH — HEALTH STABILITY: MENTAL HEALTH: HOW OFTEN DO YOU HAVE A DRINK CONTAINING ALCOHOL?: 2-3 TIMES A WEEK

## 2021-03-23 NOTE — LETTER
March 23, 2021     Connor Morales MD  [address]  [place] [facility] [address]  [place] [facility] [place] [address]    Patient: Sandra Albrecht  YOB: 1975  Date of Visit: 3/23/2021      Dear Dr. Morales:    Thank you for referring Sandra Albrecht to me for evaluation. Below are my notes for this consultation.    If you have questions, please do not hesitate to call me. I look forward to following your patient along with you.         Sincerely,        Alexsandra Lazaro MD        CC: MD Tejas Tyson Elena P, MD  3/23/2021 11:17 AM  Signed  NEW PATIENT BREAST SURGICAL CONSULTATION    This is the first visit for this 45 y.o. female referred by Connor Morales MD for left breast mastalgia since November 2020.    History of Present Illness    Sandra Albrecht is a 45 y.o. female with no family history of breast cancer.  She underwent bilateral breast screening mammogram in July 2020 demonstrating dense breast tissue, and no mammographic evidence of malignancy.  She reported pain within her left breast at the 11 to 11:30 position, 6 cm from the nipple back in November 2020.  She describes the pain as noncyclical, at the 11 to 12 o'clock position of the left breast, 6 cm from the nipple, intermittent, electric, and sharp.  She does workout regularly, but has not been doing more chest exercises than normal.  She underwent left diagnostic mammography with left targeted US on 1/4/2021 which showed no suspicious masses or sonographic abnormalities.  It is not currently bothering her, however a week ago she did have 3 days in a row where she felt this pain with movement.  She has tried ibuprofen to see if this would improve the pain, and it only minimally helped.    Otherwise, the patient has no breast-specific or systemic complaints.    Problem List:   Patient Active Problem List   Diagnosis   • Allergic rhinitis due to pollen   • Herpes simplex virus (HSV) infection   • Irritable bowel syndrome   • Mammographic calcification found on  diagnostic imaging of breast   • Open angle with borderline findings, low risk, bilateral   • Raynaud's disease without gangrene   • Mastalgia     Past Medical History:  Past Medical History:   Diagnosis Date   • Mastalgia 3/23/2021     Past Surgical History:  Past Surgical History:   Procedure Laterality Date   • COLONOSCOPY  10/01/2020   • UPPER GASTROINTESTINAL ENDOSCOPY  2018    Gastropathy     Social History:  Social History     Tobacco Use   • Smoking status: Never Smoker   • Smokeless tobacco: Never Used   Substance Use Topics   • Alcohol use: Yes     Frequency: 2-3 times a week   • Drug use: Not on file     Family History:  Family History   Problem Relation Age of Onset   • Hypertension Biological Mother    • Hypertension Biological Father    • Colon cancer Maternal Grandmother      Allergies:  Allergies   Allergen Reactions   • Aminoglycosides      Other reaction(s): UNKNOWN  neosporin rash    • Bacitracin      Other reaction(s): UNKNOWN  neosporin rash    • Polymyxin B Sulfate Other (see comments)     Medications:    Current Outpatient Medications:   •  cetirizine (ZyrTEC) 10 mg tablet, Take 1 tablet (10 mg total) by mouth daily., Disp: 90 tablet, Rfl: 1  •  famotidine (PEPCID) 10 mg tablet, Take 10 mg by mouth 2 (two) times a day., Disp: , Rfl:   •  tretinoin (RETIN-A) 0.05 % cream, APPLY TO FACE EVERY EVENING, Disp: , Rfl:   •  montelukast (SINGULAIR) 10 mg tablet, Take 1 tablet (10 mg total) by mouth nightly. (Patient taking differently: Take 10 mg by mouth as needed.  ), Disp: 30 tablet, Rfl: 1  •  valACYclovir (VALTREX) 1 gram tablet, Take 2 tablets (2,000 mg total) by mouth 2 (two) times a day for 1 day. (Patient not taking: Reported on 2021 ), Disp: 20 tablet, Rfl: 0    Obstetric and Gynecologic History  Age at menarche: 11  Age at first live birth: 30   .   Age at menopause: No age on file  Breastfeeding? yes, 6 months  HRT: no  Fertility Tx: no  OCP: yes, age 18-30     Review of  Systems:  Review of Systems   Constitutional: Negative for activity change, appetite change, chills, diaphoresis, fatigue, fever and unexpected weight change.   HENT: Negative for congestion, facial swelling, hearing loss, rhinorrhea, sinus pressure, sneezing, sore throat, trouble swallowing and voice change.    Eyes: Negative for visual disturbance.   Respiratory: Negative for apnea, cough, choking, chest tightness, shortness of breath, wheezing and stridor.    Cardiovascular: Negative for chest pain, palpitations and leg swelling.   Gastrointestinal: Negative for abdominal distention, abdominal pain, anal bleeding, blood in stool, constipation, diarrhea, nausea, rectal pain and vomiting.   Endocrine: Negative for polyuria.   Genitourinary: Negative for decreased urine volume, difficulty urinating, dysuria, flank pain, frequency, hematuria and urgency.   Musculoskeletal: Negative for arthralgias, back pain, gait problem, joint swelling and myalgias.   Skin: Negative for color change, pallor, rash and wound.   Allergic/Immunologic: Positive for environmental allergies. Negative for immunocompromised state.   Neurological: Negative for dizziness, seizures, syncope, weakness, numbness and headaches.   Hematological: Negative for adenopathy. Does not bruise/bleed easily.   Psychiatric/Behavioral: The patient is not nervous/anxious.      Physical Examination:  Vitals:    03/23/21 0953   BP: 118/72   Pulse: 80   Temp: 37.1 °C (98.8 °F)   SpO2: 98%     Physical Exam   Constitutional: She is oriented to person, place, and time. She appears well-developed and well-nourished. No distress.   HENT:   Head: Normocephalic and atraumatic.   Nose: Nose normal.   Mouth/Throat: Oropharynx is clear and moist.   Eyes: No scleral icterus.   Neck: Normal range of motion. Neck supple. No JVD present. No tracheal deviation present. No thyromegaly present.   Cardiovascular: Normal rate, regular rhythm, normal heart sounds and intact distal  pulses. Exam reveals no gallop and no friction rub.   No murmur heard.  Pulmonary/Chest: Effort normal and breath sounds normal. No stridor. No respiratory distress. She has no wheezes. She has no rales. She exhibits no tenderness. Right breast exhibits no inverted nipple, no mass, no nipple discharge, no skin change and no tenderness. Left breast exhibits no inverted nipple, no mass, no nipple discharge, no skin change and no tenderness. Breasts are symmetrical.   Heterogeneously dense breast tissue   Abdominal: Soft. Bowel sounds are normal. She exhibits no distension and no mass. There is no abdominal tenderness. There is no rebound and no guarding.   Musculoskeletal: Normal range of motion.         General: No tenderness, deformity or edema.   Lymphadenopathy:        Head (right side): No preauricular and no posterior auricular adenopathy present.        Head (left side): No preauricular and no posterior auricular adenopathy present.     She has no cervical adenopathy.        Right axillary: No pectoral and no lateral adenopathy present.        Left axillary: No pectoral and no lateral adenopathy present.  Neurological: She is alert and oriented to person, place, and time.   Skin: Skin is warm and dry. No rash noted. She is not diaphoretic. No erythema. No pallor. Nails show no clubbing.   Psychiatric: She has a normal mood and affect.   Nursing note and vitals reviewed.    Imaging Review:  IMAGING: I have personally reviewed the studies and their interpretation, which we rely upon.     Left diagnostic mammography with left targeted US on 1/4/2021 which showed no suspicious masses or sonographic abnormalities.    Bilateral breast screening mammogram in July 2020 demonstrating dense breast tissue, and no mammographic evidence of malignancy.     ----------------------------------------------    IMPRESSION: 45-year-old female with left breast mastalgia since November 2020    RECOMMENDATIONS:    I reviewed with Sandra  Trena the results of her bilateral breast screening mammography that she underwent in July 2020 demonstrating no mammographic evidence of malignancy, and left breast diagnostic mammography as well as left targeted ultrasound demonstrating no abnormalities in the area of pain at the 11 o'clock position, 6 cm from the nipple.  She knows that she will be due for bilateral breast mammography in July 2021, and this is ordered every year by Dr. Morales.    We discussed interventions that she can do to decrease her bilateral breast mastalgia such as compresses, breast massage, decreasing her caffeine intake, ensuring that she has a proper fitting bra, and a trial of evening primrose oil.     She has been informed if the left breast mastalgia does not improve, to come back for repeat evaluation.    I spent 40 minutes on this date of service performing the following activities: obtaining history, performing examination, documenting, preparing for visit, obtaining / reviewing records, providing counseling and education, independently reviewing study/studies, communicating results and coordinating care.     All of her questions were answered.  The patient is in agreement with the treatment plan.  She knows to call with any questions or concerns.

## 2021-03-23 NOTE — PATIENT INSTRUCTIONS
I reviewed with Sandra Albrecht the results of her bilateral breast screening mammography that she underwent in July 2020 demonstrating no mammographic evidence of malignancy, and left breast diagnostic mammography as well as left targeted ultrasound demonstrating no abnormalities in the area of pain at the 11 o'clock position, 6 cm from the nipple.    We discussed interventions that she can do to decrease her bilateral breast mastalgia such as compresses, breast massage, decreasing her caffeine intake, ensuring that she has a proper fitting bra, and a trial of evening primrose oil.

## 2021-05-13 ENCOUNTER — OFFICE VISIT (OUTPATIENT)
Dept: FAMILY MEDICINE | Facility: CLINIC | Age: 46
End: 2021-05-13
Payer: COMMERCIAL

## 2021-05-13 VITALS
SYSTOLIC BLOOD PRESSURE: 116 MMHG | WEIGHT: 112.8 LBS | HEART RATE: 64 BPM | HEIGHT: 61 IN | TEMPERATURE: 98 F | OXYGEN SATURATION: 99 % | DIASTOLIC BLOOD PRESSURE: 88 MMHG | BODY MASS INDEX: 21.3 KG/M2

## 2021-05-13 DIAGNOSIS — R42 EPISODE OF DIZZINESS: Primary | ICD-10-CM

## 2021-05-13 PROBLEM — H40.013 OPEN ANGLE WITH BORDERLINE FINDINGS, LOW RISK, BILATERAL: Status: RESOLVED | Noted: 2020-12-10 | Resolved: 2021-05-13

## 2021-05-13 PROBLEM — H40.013 OPEN ANGLE WITH BORDERLINE FINDINGS, LOW RISK, BILATERAL: Status: ACTIVE | Noted: 2021-05-13

## 2021-05-13 PROCEDURE — 3008F BODY MASS INDEX DOCD: CPT | Performed by: NURSE PRACTITIONER

## 2021-05-13 PROCEDURE — 99213 OFFICE O/P EST LOW 20 MIN: CPT | Performed by: NURSE PRACTITIONER

## 2021-05-13 ASSESSMENT — ENCOUNTER SYMPTOMS
HEADACHES: 0
LIGHT-HEADEDNESS: 0
SINUS PRESSURE: 0
SPEECH DIFFICULTY: 0
NUMBNESS: 0
SINUS PAIN: 0
WEAKNESS: 0
DIZZINESS: 1

## 2021-05-13 NOTE — ASSESSMENT & PLAN NOTE
Discussed etiology probably escalation of allergy symptoms  Restart regimen of both claritin and zyrtec daily  F/u 1 wk if no resolution of symptoms or worsening

## 2021-05-13 NOTE — PATIENT INSTRUCTIONS
Problem List Items Addressed This Visit        Other    Episode of dizziness - Primary     Discussed etiology probably escalation of allergy symptoms  Restart regimen of both claritin and zyrtec daily  F/u 1 wk if no resolution of symptoms or worsening

## 2021-05-13 NOTE — PROGRESS NOTES
"  Daily Progress Note      Subjective      Patient ID: Sandra Albrecht is a 45 y.o. female.    2 days ago stood up from chair and felt dizzy and room was spinning a bit-had no LOC nor did she fall-episode resolved within several seconds  Since then notes several more incidents  This am was just sitting at computer and felt dizzy for a few seconds and then resolved spontaneously  Has allergies (used to take allegra in am and zyrtec at night) but recently stopped the allegra-now notes more pressure in ears-wonders if that has anything to do with it-has hx of bad allergies and thinks ear fullness is result of decreasing meds and may elicit the dizziness  Denies any change in headache, vision, weakness, numbness, tingling  Denies any elicitation of symptoms if she turns her head, averts her gaze or changes position        The following have been reviewed and updated as appropriate in this visit:       Review of Systems   HENT: Positive for congestion. Negative for ear pain, sinus pressure and sinus pain.         Ears feel \"full\" s/p decreasing allergy medicine   Neurological: Positive for dizziness (transient for several seconds intermittently over past few days). Negative for syncope, speech difficulty, weakness, light-headedness, numbness and headaches.       Current Outpatient Medications   Medication Sig Dispense Refill   • cetirizine (ZyrTEC) 10 mg tablet Take 1 tablet (10 mg total) by mouth daily. 90 tablet 1   • famotidine (PEPCID) 10 mg tablet Take 10 mg by mouth 2 (two) times a day.     • tretinoin (RETIN-A) 0.05 % cream APPLY TO FACE EVERY EVENING     • montelukast (SINGULAIR) 10 mg tablet Take 1 tablet (10 mg total) by mouth nightly. (Patient not taking: Reported on 5/13/2021 ) 30 tablet 1   • valACYclovir (VALTREX) 1 gram tablet Take 2 tablets (2,000 mg total) by mouth 2 (two) times a day for 1 day. (Patient not taking: Reported on 2/17/2021 ) 20 tablet 0     No current facility-administered medications for this " visit.     Past Medical History:   Diagnosis Date   • Mastalgia 3/23/2021     Family History   Problem Relation Age of Onset   • Hypertension Biological Mother    • Hypertension Biological Father    • Colon cancer Maternal Grandmother      Past Surgical History:   Procedure Laterality Date   • COLONOSCOPY  10/01/2020   • UPPER GASTROINTESTINAL ENDOSCOPY  04/06/2018    Gastropathy     Social History     Socioeconomic History   • Marital status:      Spouse name: Not on file   • Number of children: Not on file   • Years of education: Not on file   • Highest education level: Not on file   Occupational History   • Occupation: teacher [employer]   Tobacco Use   • Smoking status: Never Smoker   • Smokeless tobacco: Never Used   Substance and Sexual Activity   • Alcohol use: Yes   • Drug use: Not on file   • Sexual activity: Not on file   Other Topics Concern   • Not on file   Social History Narrative   • Not on file     Social Determinants of Health     Financial Resource Strain:    • Difficulty of Paying Living Expenses:    Food Insecurity:    • Worried About Running Out of Food in the Last Year:    • Ran Out of Food in the Last Year:    Transportation Needs:    • Lack of Transportation (Medical):    • Lack of Transportation (Non-Medical):    Physical Activity:    • Days of Exercise per Week:    • Minutes of Exercise per Session:    Stress:    • Feeling of Stress :    Social Connections:    • Frequency of Communication with Friends and Family:    • Frequency of Social Gatherings with Friends and Family:    • Attends Jewish Services:    • Active Member of Clubs or Organizations:    • Attends Club or Organization Meetings:    • Marital Status:    Intimate Partner Violence:    • Fear of Current or Ex-Partner:    • Emotionally Abused:    • Physically Abused:    • Sexually Abused:      Allergies   Allergen Reactions   • Aminoglycosides      Other reaction(s): UNKNOWN  neosporin rash    • Bacitracin      Other  reaction(s): UNKNOWN  neosporin rash    • Polymyxin B Sulfate Other (see comments)       Objective       Physical Exam  HENT:      Right Ear: Tympanic membrane and ear canal normal.      Left Ear: Tympanic membrane and ear canal normal.      Nose: Nose normal.      Mouth/Throat:      Mouth: Mucous membranes are dry.   Cardiovascular:      Rate and Rhythm: Normal rate and regular rhythm.   Neurological:      General: No focal deficit present.      Mental Status: She is oriented to person, place, and time.      Cranial Nerves: No cranial nerve deficit.      Motor: No weakness.      Deep Tendon Reflexes: Reflexes normal.      Comments: No nystagmus  No dizziness with change position head/eyes         Assessment/Plan     Problem List Items Addressed This Visit        Other    Episode of dizziness - Primary     Discussed etiology probably escalation of allergy symptoms  Restart regimen of both claritin and zyrtec daily  F/u 1 wk if no resolution of symptoms or worsening             No orders of the defined types were placed in this encounter.      PATRICIA Hanley  5/13/2021    Written education and action steps suggested to the patient are documented in the after visit summary/patient instruction section of this encounter

## 2021-07-02 ENCOUNTER — TRANSCRIBE ORDERS (OUTPATIENT)
Dept: SCHEDULING | Age: 46
End: 2021-07-02

## 2021-07-02 DIAGNOSIS — R10.9 PAIN IN THE ABDOMEN: Primary | ICD-10-CM

## 2021-07-16 ENCOUNTER — HOSPITAL ENCOUNTER (OUTPATIENT)
Dept: RADIOLOGY | Age: 46
Discharge: HOME | End: 2021-07-16
Attending: INTERNAL MEDICINE
Payer: COMMERCIAL

## 2021-07-16 DIAGNOSIS — R10.9 PAIN IN THE ABDOMEN: ICD-10-CM

## 2021-07-16 PROCEDURE — 76700 US EXAM ABDOM COMPLETE: CPT

## 2021-08-17 ENCOUNTER — HOSPITAL ENCOUNTER (OUTPATIENT)
Dept: RADIOLOGY | Age: 46
Discharge: HOME | End: 2021-08-17
Attending: OBSTETRICS & GYNECOLOGY
Payer: COMMERCIAL

## 2021-08-17 DIAGNOSIS — Z12.31 SCREENING MAMMOGRAM, ENCOUNTER FOR: ICD-10-CM

## 2021-08-17 PROCEDURE — 77067 SCR MAMMO BI INCL CAD: CPT

## 2021-08-17 PROCEDURE — 77063 BREAST TOMOSYNTHESIS BI: CPT

## 2021-10-27 NOTE — TELEPHONE ENCOUNTER
Medicine Refill Request    Last Office Visit: 5/13/2021  Last Telemedicine Visit: Visit date not found    Next Office Visit: Visit date not found  Next Telemedicine Visit: Visit date not found         Current Outpatient Medications:   •  cetirizine (ZyrTEC) 10 mg tablet, Take 1 tablet (10 mg total) by mouth daily., Disp: 90 tablet, Rfl: 1  •  famotidine (PEPCID) 10 mg tablet, Take 10 mg by mouth 2 (two) times a day., Disp: , Rfl:   •  montelukast (SINGULAIR) 10 mg tablet, Take 1 tablet (10 mg total) by mouth nightly. (Patient not taking: Reported on 5/13/2021 ), Disp: 30 tablet, Rfl: 1  •  tretinoin (RETIN-A) 0.05 % cream, APPLY TO FACE EVERY EVENING, Disp: , Rfl:   •  valACYclovir (VALTREX) 1 gram tablet, Take 2 tablets (2,000 mg total) by mouth 2 (two) times a day for 1 day. (Patient not taking: Reported on 2/17/2021 ), Disp: 20 tablet, Rfl: 0      BP Readings from Last 3 Encounters:   05/13/21 116/88   03/23/21 118/72   02/17/21 112/72       Recent Lab results:  Lab Results   Component Value Date    CHOL 203 (H) 01/27/2021   ,   Lab Results   Component Value Date    HDL 95 01/27/2021   ,   Lab Results   Component Value Date    LDLCALC 93 01/27/2021   ,   Lab Results   Component Value Date    TRIG 60 01/27/2021        Lab Results   Component Value Date    GLUCOSE 81 01/27/2021    GLUCOSE NEGATIVE 01/27/2021   , No results found for: HGBA1C      Lab Results   Component Value Date    CREATININE 0.71 01/27/2021       Lab Results   Component Value Date    TSH 0.93 12/09/2017

## 2021-10-28 RX ORDER — VALACYCLOVIR HYDROCHLORIDE 1 G/1
2000 TABLET, FILM COATED ORAL 2 TIMES DAILY
Qty: 20 TABLET | Refills: 0 | Status: SHIPPED | OUTPATIENT
Start: 2021-10-28 | End: 2022-04-18 | Stop reason: ALTCHOICE

## 2022-02-03 ENCOUNTER — TELEPHONE (OUTPATIENT)
Dept: OTOLARYNGOLOGY | Facility: CLINIC | Age: 47
End: 2022-02-03
Payer: COMMERCIAL

## 2022-02-03 DIAGNOSIS — Z11.52 ENCOUNTER FOR PREOPERATIVE SCREENING LABORATORY TESTING FOR COVID-19 VIRUS: Primary | ICD-10-CM

## 2022-02-03 DIAGNOSIS — Z01.812 ENCOUNTER FOR PREOPERATIVE SCREENING LABORATORY TESTING FOR COVID-19 VIRUS: Primary | ICD-10-CM

## 2022-04-14 ENCOUNTER — APPOINTMENT (OUTPATIENT)
Dept: LAB | Facility: HOSPITAL | Age: 47
End: 2022-04-14
Attending: OTOLARYNGOLOGY
Payer: COMMERCIAL

## 2022-04-14 DIAGNOSIS — Z01.812 ENCOUNTER FOR PREOPERATIVE SCREENING LABORATORY TESTING FOR COVID-19 VIRUS: ICD-10-CM

## 2022-04-14 DIAGNOSIS — Z11.52 ENCOUNTER FOR PREOPERATIVE SCREENING LABORATORY TESTING FOR COVID-19 VIRUS: ICD-10-CM

## 2022-04-14 LAB — SARS-COV-2 RNA RESP QL NAA+PROBE: NEGATIVE

## 2022-04-14 PROCEDURE — U0003 INFECTIOUS AGENT DETECTION BY NUCLEIC ACID (DNA OR RNA); SEVERE ACUTE RESPIRATORY SYNDROME CORONAVIRUS 2 (SARS-COV-2) (CORONAVIRUS DISEASE [COVID-19]), AMPLIFIED PROBE TECHNIQUE, MAKING USE OF HIGH THROUGHPUT TECHNOLOGIES AS DESCRIBED BY CMS-2020-01-R: HCPCS

## 2022-04-14 PROCEDURE — C9803 HOPD COVID-19 SPEC COLLECT: HCPCS

## 2022-04-18 ENCOUNTER — OFFICE VISIT (OUTPATIENT)
Dept: OTOLARYNGOLOGY | Facility: CLINIC | Age: 47
End: 2022-04-18
Payer: COMMERCIAL

## 2022-04-18 ENCOUNTER — PROCEDURE VISIT (OUTPATIENT)
Dept: OTOLARYNGOLOGY | Facility: CLINIC | Age: 47
End: 2022-04-18
Payer: COMMERCIAL

## 2022-04-18 VITALS
HEART RATE: 73 BPM | OXYGEN SATURATION: 97 % | SYSTOLIC BLOOD PRESSURE: 110 MMHG | WEIGHT: 112 LBS | HEIGHT: 61 IN | DIASTOLIC BLOOD PRESSURE: 60 MMHG | BODY MASS INDEX: 21.14 KG/M2

## 2022-04-18 DIAGNOSIS — J30.1 NON-SEASONAL ALLERGIC RHINITIS DUE TO POLLEN: ICD-10-CM

## 2022-04-18 DIAGNOSIS — J34.89 NASAL OBSTRUCTION: Primary | ICD-10-CM

## 2022-04-18 DIAGNOSIS — J38.4 LARYNGEAL EDEMA: ICD-10-CM

## 2022-04-18 DIAGNOSIS — H69.93 DYSFUNCTION OF BOTH EUSTACHIAN TUBES: ICD-10-CM

## 2022-04-18 DIAGNOSIS — H93.293 ABNORMAL AUDITORY PERCEPTION OF BOTH EARS: ICD-10-CM

## 2022-04-18 DIAGNOSIS — H81.10 BENIGN PAROXYSMAL POSITIONAL VERTIGO, UNSPECIFIED LATERALITY: ICD-10-CM

## 2022-04-18 DIAGNOSIS — J34.2 NASAL SEPTAL DEVIATION: ICD-10-CM

## 2022-04-18 DIAGNOSIS — R51.9 RHINOGENIC HEADACHE: ICD-10-CM

## 2022-04-18 DIAGNOSIS — H93.293 ABNORMAL AUDITORY PERCEPTION OF BOTH EARS: Primary | ICD-10-CM

## 2022-04-18 DIAGNOSIS — K21.9 LARYNGOPHARYNGEAL REFLUX (LPR): ICD-10-CM

## 2022-04-18 PROCEDURE — 92567 TYMPANOMETRY: CPT | Performed by: AUDIOLOGIST-HEARING AID FITTER

## 2022-04-18 PROCEDURE — 99204 OFFICE O/P NEW MOD 45 MIN: CPT | Mod: 25 | Performed by: OTOLARYNGOLOGY

## 2022-04-18 PROCEDURE — 92557 COMPREHENSIVE HEARING TEST: CPT | Performed by: AUDIOLOGIST-HEARING AID FITTER

## 2022-04-18 PROCEDURE — 3008F BODY MASS INDEX DOCD: CPT | Performed by: OTOLARYNGOLOGY

## 2022-04-18 PROCEDURE — 99999 PR OFFICE/OUTPT VISIT,PROCEDURE ONLY: CPT | Performed by: AUDIOLOGIST-HEARING AID FITTER

## 2022-04-18 PROCEDURE — 31231 NASAL ENDOSCOPY DX: CPT | Performed by: OTOLARYNGOLOGY

## 2022-04-18 RX ORDER — OLOPATADINE HYDROCHLORIDE 665 UG/1
1 SPRAY NASAL 2 TIMES DAILY
Qty: 30.5 G | Refills: 6 | Status: SHIPPED | OUTPATIENT
Start: 2022-04-18 | End: 2022-05-05

## 2022-04-18 ASSESSMENT — ENCOUNTER SYMPTOMS
DYSPHORIC MOOD: 0
VOICE CHANGE: 0
LIGHT-HEADEDNESS: 1
BACK PAIN: 0
POLYPHAGIA: 0
CONSTIPATION: 0
NAUSEA: 0
FREQUENCY: 0
VOMITING: 0
FEVER: 0
DIZZINESS: 1
DIARRHEA: 0
TROUBLE SWALLOWING: 0
ARTHRALGIAS: 0
COUGH: 0
SLEEP DISTURBANCE: 0
SINUS PRESSURE: 1
HEADACHES: 1
NERVOUS/ANXIOUS: 0
FATIGUE: 0
SHORTNESS OF BREATH: 0
WHEEZING: 0
RHINORRHEA: 0
PALPITATIONS: 0
WEAKNESS: 0
MYALGIAS: 0
ADENOPATHY: 0
SINUS PAIN: 1

## 2022-04-18 NOTE — PROGRESS NOTES
[facility]  [address], [address]  [place] [address]  Phone: [phone]  Fax: [phone]      Patient ID: Sandra Albrecht                              : 1975    Visit Date: 2022  Referring Provider: Som Ordoñez MD    Chief Complaint: Nasal Congestion, Ear Fullness, and Vertigo      Sandra Albrecht is a 46 y.o. female who presented to the [place] office today for consultation in regard to nasal congestion and aural fullness plus intermittent vertigo.    Sandra has a long history of allergic rhinitis.  In fact, she has struggled with allergies for at least 15 years.  At that time, she underwent a CT of the sinuses which was clear.  She was seen by an allergist who found her to be multiply allergic with year-round plus seasonal allergens.  She was placed on nasal steroids and systemic antihistamines and did quite well for many years.  However, she began to develop esophageal candidiasis and reflux which was determined to be caused by the nasal steroids.  Ultimately she was taken off of nasal steroids and was told to use antihistamines with decongestants to control her sinus congestion, nasal congestion, sinus related headaches and general fullness throughout the head.  This strategy did not work.  She finally returned to her allergist in 2021 and was placed on allergy shots which are helping.  In addition, she was placed on montelukast, 10 mg daily to be taken along with Zyrtec daily and this has made a difference.    However, Sandra decided to make this appointment because even with the new strategies for allergies, she still experiences left-sided nasal airway obstruction, intermittent sinonasal pressure and rhinogenic headaches.  Her senses of smell and taste are normal.  She previously had vertigo associated with the allergy season but this spring that symptom is not present.  She has subtle dizziness once in a while.  She has not had asthma.  Her ears will become clogged  "the sensation of \"needing to pop\" during the height of her allergy symptoms.  The patient takes Sudafed for this symptom and when the sinus headaches occur.    Review of Systems   Constitutional: Negative for fatigue and fever.   HENT: Positive for congestion, postnasal drip, sinus pressure and sinus pain. Negative for hearing loss, nosebleeds, rhinorrhea, tinnitus, trouble swallowing and voice change.    Eyes: Negative for visual disturbance.   Respiratory: Negative for cough, shortness of breath and wheezing.    Cardiovascular: Negative for chest pain and palpitations.   Gastrointestinal: Negative for constipation, diarrhea, nausea and vomiting.   Endocrine: Negative for polyphagia and polyuria.   Genitourinary: Negative for frequency.   Musculoskeletal: Negative for arthralgias, back pain, gait problem and myalgias.   Skin: Negative for rash.   Allergic/Immunologic: Negative for environmental allergies.   Neurological: Positive for dizziness, light-headedness and headaches. Negative for weakness.   Hematological: Negative for adenopathy.   Psychiatric/Behavioral: Negative for dysphoric mood and sleep disturbance. The patient is not nervous/anxious.        Current Medications:Cetirizine, famotidine, montelukast, and tretinoin.    Past Medical History: Episodic mastalgia.  Open angle glaucoma with low risk.  Allergic rhinitis.  Raynaud's disease.  Irritable bowel syndrome.    Past Surgical History: Status post colonoscopy, upper endoscopy x2 due to gastropathy.    Social History: The patient is .  She has 2 children ages 17 and 14.  She is a  at the [employer].  She has never smoked.  She rarely drinks alcohol.    Family History: Reviewed and not contributory to the patient's current complaint.    Allergies: Aminoglycosides, Bacitracin, and Polymyxin b sulfate    Physical Exam:  Vitals:   Visit Vitals  /60 (BP Location: Left upper arm, Patient Position: Sitting) " "  Pulse 73   Ht 1.549 m (5' 1\")   Wt 50.8 kg (112 lb)   SpO2 97%   BMI 21.16 kg/m²     General:  Well-developed, well-nourished 46 y.o. in no acute distress.  Voice: Normal without hoarseness, breathiness or stridor.  Head/face:  No scars or lesions.  No parotid masses. No presinus tenderness. Seventh cranial nerves intact.  Eyes:  Extraocular movements and gaze alignment normal.  Ears: Auricles normal.  External auditory canals free of cerumen.  Tympanic membranes clear, mobile and without retractions.  Nose:  Dorsum straight.  Septum sinusoidal.  Turbinates markedly edematous and purple particularly on the left but normal in size and orientation on the right.  No pus, polyps or crusts.  Oral Cavity/oropharynx:  Normal tongue thrust. Normal gag reflex. No masses, leukoplakia, erythroplakia, ulcers or other abnormalities at the tongue, floor of mouth, buccal mucosa palate.  Cobblestoning at the posterior pharyngeal wall.  Larynx/hypopharynx: Evaluated endoscopically at the time of nasal endoscopy.  Neck:  No masses, adenopathy, cervical spasm or thyroid enlargement.  Cranial Nerves II through XII: Grossly intact.  Mental status: Awake, alert and oriented ×3.  No depression or anxiety.    Audiogram: Symmetrical normal hearing across all frequencies bilaterally.  Discrimination scores: 100% of 50 dB bilateral.    Tympanogram: Normal bilaterally    Nasal / sinus endoscopy    Date/Time: 4/18/2022 1:38 PM  Performed by: Lupe Botello MD  Authorized by: Lupe Botello MD     Consent:     Consent obtained:  Verbal    Consent given by:  Patient  Post-procedure details:     Patient tolerance of procedure:  Tolerated well, no immediate complications  Comments:      With the patient's verbal consent and after application of topical lidocaine 4%, the fiberoptic endoscope was introduced first into the right and then into the left nasal airways.    Findings:  Inferior turbinates: Right is normal in size and " orientation.  Left is hypertrophic and purple taking up a lot of room.  Septum: Deflected in a curve to the right with spurs at the floor to the left.  Mucosa overall is purple.  Nasopharynx: Without masses.  Eustachian tube orifices patent.  Middle turbinates: Normal in size paradoxical.  No inflammation.  Polyps: None.  Larynx/hypopharynx: Epiglottis is sharp.  Arytenoids edematous and mobile.  Vocal folds smooth and white and without nodularity.  No masses at the base of tongue, vallecula or piriform sinuses.  No pooling of secretions.  Post glottic edema.      Impression:  1.  Nasal airway obstruction due to septal deviation, turbinate hypertrophy, chronic allergic rhinitis with dramatic inflammation.  2.  Rhinogenic headaches which relate to swelling within the nose.  3.  Eustachian tube dysfunction also relational to allergic rhinitis.  4.  Laryngopharyngeal reflux and laryngeal edema.  The patient takes famotidine for this.    5.  Episodes of dizziness or BPPV possibly triggered by eustachian tube dysfunction.  The patient will try habituation exercises as needed.  If true vertigo occurs, the patient will contact me.  Vestibular therapy would be indicated then.    Recommendations and Plan:  1.  Simply Saline, 2 squirts to each nostril 3 times daily.  2.  Olopatadine, 1 squirt to each nostril after the saline at night.  If this is well-tolerated after 1 week, try it twice daily to control all allergy and the ear symptoms.  3.  Singulair, 10 mg at bedtime.  4.  Add Allegra or Zyrtec once daily if needed.  5.  Try Breathe Right strips overnight for congestion if needed.  Brand only.  6.  Drink plenty of water, at least 50 ounces per day.  7.  If you are dizzy, try the habituation exercises.  Perform all of the exercises and see which ones bring on the dizziness.  Do these twice daily until they no longer cause dizziness.  If you develop true vertigo, please contact me.  8.  Follow-up in 6 months.        Lupe  KRISTIN Botello MD

## 2022-04-18 NOTE — PATIENT INSTRUCTIONS
1.  Simply Saline, 2 squirts to each nostril 3 times daily.  2.  Olopatadine, 1 squirt to each nostril after the saline at night.  If this is well-tolerated after 1 week, try it twice daily to control all allergy symptoms.  3.  Singulair, 10 mg at bedtime.  4.  Add Allegra or Zyrtec once daily if needed.  5.  Try Breathe Right strips overnight for congestion if needed.  Brand only.  6.  Drink plenty of water, at least 50 ounces per day.  7.  If you are dizzy, try the habituation exercises.  Perform all of the exercises and see which ones bring on the dizziness.  Do these twice daily until they no longer cause dizziness.  If you develop vertigo, please contact me.  8.  Follow-up in 6 months.

## 2022-04-18 NOTE — PROCEDURES
Nasal / sinus endoscopy    Date/Time: 4/18/2022 1:38 PM  Performed by: Lupe Botello MD  Authorized by: Lupe Botello MD     Consent:     Consent obtained:  Verbal    Consent given by:  Patient  Post-procedure details:     Patient tolerance of procedure:  Tolerated well, no immediate complications  Comments:      With the patient's verbal consent and after application of topical lidocaine 4%, the fiberoptic endoscope was introduced first into the right and then into the left nasal airways.    Findings:  Inferior turbinates: Right is normal in size and orientation.  Left is hypertrophic and purple taking up a lot of room.  Septum: Deflected in a curve to the right with spurs at the floor to the left.  Mucosa overall is purple.  Nasopharynx: Without masses.  Eustachian tube orifices patent.  Middle turbinates: Normal in size paradoxical.  No inflammation.  Polyps: None.  Larynx/hypopharynx: Epiglottis is sharp.  Arytenoids edematous and mobile.  Vocal folds smooth and white and without nodularity.  No masses at the base of tongue, vallecula or piriform sinuses.  No pooling of secretions.  Post glottic edema.

## 2022-05-05 ENCOUNTER — OFFICE VISIT (OUTPATIENT)
Dept: FAMILY MEDICINE | Facility: CLINIC | Age: 47
End: 2022-05-05
Payer: COMMERCIAL

## 2022-05-05 VITALS
OXYGEN SATURATION: 99 % | RESPIRATION RATE: 15 BRPM | WEIGHT: 115.8 LBS | SYSTOLIC BLOOD PRESSURE: 102 MMHG | BODY MASS INDEX: 21.86 KG/M2 | TEMPERATURE: 98.1 F | HEART RATE: 66 BPM | DIASTOLIC BLOOD PRESSURE: 80 MMHG | HEIGHT: 61 IN

## 2022-05-05 DIAGNOSIS — K58.1 IRRITABLE BOWEL SYNDROME WITH CONSTIPATION: ICD-10-CM

## 2022-05-05 DIAGNOSIS — J30.1 SEASONAL ALLERGIC RHINITIS DUE TO POLLEN: ICD-10-CM

## 2022-05-05 DIAGNOSIS — Z00.00 ENCOUNTER FOR GENERAL ADULT MEDICAL EXAMINATION WITHOUT ABNORMAL FINDINGS: Primary | ICD-10-CM

## 2022-05-05 DIAGNOSIS — K21.00 GASTROESOPHAGEAL REFLUX DISEASE WITH ESOPHAGITIS WITHOUT HEMORRHAGE: ICD-10-CM

## 2022-05-05 PROCEDURE — 3008F BODY MASS INDEX DOCD: CPT | Performed by: FAMILY MEDICINE

## 2022-05-05 PROCEDURE — 99396 PREV VISIT EST AGE 40-64: CPT | Mod: 25 | Performed by: FAMILY MEDICINE

## 2022-05-05 RX ORDER — SUCRALFATE 1 G/1
1 TABLET ORAL 4 TIMES DAILY
Qty: 120 TABLET | Refills: 1 | Status: SHIPPED | OUTPATIENT
Start: 2022-05-05 | End: 2022-11-07

## 2022-05-05 ASSESSMENT — ENCOUNTER SYMPTOMS
FEVER: 0
HEMATURIA: 0
SINUS PRESSURE: 0
VOMITING: 0
SINUS PAIN: 0
CHEST TIGHTNESS: 0
APPETITE CHANGE: 0
ABDOMINAL PAIN: 0
COUGH: 0
DIARRHEA: 0
WHEEZING: 0
SHORTNESS OF BREATH: 0
PALPITATIONS: 0
EYE DISCHARGE: 0
DIZZINESS: 0
NUMBNESS: 0
NAUSEA: 0
WEAKNESS: 0
ACTIVITY CHANGE: 0
TROUBLE SWALLOWING: 0
DIFFICULTY URINATING: 0
SORE THROAT: 0
ARTHRALGIAS: 0
FREQUENCY: 0
JOINT SWELLING: 0
CONSTIPATION: 0

## 2022-05-05 NOTE — PROGRESS NOTES
Subjective      Patient ID: Sandra Albrecht is a 46 y.o. female.  1975      Patient annual exam.  Patient with no new medical complaints today.  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  She is .  She has 2 teenage children.  She typically has 3 meals per day.  She has an excellent exercise program she sleeps well at night.  Patient has had difficulty with acid reflux.  She has also difficulty tolerating both H2 blockers and proton pump inhibitors.  She has gotten candidal infections of her esophagus.  Felt to be secondary to these medications as well as antacids.  Patient does visit with gynecology once a year  Patient also with seasonal allergies  Desensitization shots with some success   has a past medical history of Mastalgia (3/23/2021).   has a past surgical history that includes Upper gastrointestinal endoscopy (04/06/2018); Colonoscopy (10/01/2020); and Upper gastrointestinal endoscopy (08/02/2021).      The following have been reviewed and updated as appropriate in this visit:   Allergies  Meds  Problems         Review of Systems   Constitutional: Negative for activity change, appetite change and fever.   HENT: Negative for congestion, postnasal drip, sinus pressure, sinus pain, sore throat and trouble swallowing.    Eyes: Negative for discharge and visual disturbance.   Respiratory: Negative for cough, chest tightness, shortness of breath and wheezing.    Cardiovascular: Negative for chest pain and palpitations.   Gastrointestinal: Negative for abdominal pain, constipation, diarrhea, nausea and vomiting.   Genitourinary: Negative for difficulty urinating, frequency and hematuria.   Musculoskeletal: Negative for arthralgias, gait problem and joint swelling.   Skin: Negative for rash.   Neurological: Negative for dizziness, weakness and numbness.       Objective     Vitals:    05/05/22 1423   BP: 102/80   Pulse: 66   Resp: 15   Temp: 36.7 °C (98.1 °F)   TempSrc: Oral  "  SpO2: 99%   Weight: 52.5 kg (115 lb 12.8 oz)   Height: 1.549 m (5' 1\")     Body mass index is 21.88 kg/m².    Physical Exam  Vitals reviewed.   Constitutional:       Appearance: She is well-developed.   HENT:      Head: Normocephalic and atraumatic.      Right Ear: Tympanic membrane, ear canal and external ear normal.      Left Ear: Tympanic membrane, ear canal and external ear normal.      Nose: Nose normal.   Eyes:      Conjunctiva/sclera: Conjunctivae normal.      Pupils: Pupils are equal, round, and reactive to light.   Neck:      Thyroid: No thyromegaly.   Cardiovascular:      Rate and Rhythm: Normal rate and regular rhythm.      Pulses:           Dorsalis pedis pulses are 2+ on the right side and 2+ on the left side.        Posterior tibial pulses are 2+ on the right side and 2+ on the left side.      Heart sounds: Normal heart sounds.   Pulmonary:      Effort: Pulmonary effort is normal.      Breath sounds: Normal breath sounds. No wheezing.   Abdominal:      General: Bowel sounds are normal.      Palpations: Abdomen is soft. There is no mass.      Tenderness: There is no abdominal tenderness.   Musculoskeletal:         General: No tenderness. Normal range of motion.      Cervical back: Normal range of motion and neck supple.   Lymphadenopathy:      Cervical: No cervical adenopathy.   Skin:     General: Skin is warm and dry.      Findings: No rash.   Neurological:      Mental Status: She is alert and oriented to person, place, and time.   Psychiatric:         Behavior: Behavior normal.         Assessment/Plan   Diagnoses and all orders for this visit:    Encounter for general adult medical examination without abnormal findings (Primary)  -     Comprehensive metabolic panel; Future  -     Lipid panel; Future  -     CBC; Future  -     Urinalysis with microscopic; Future    Seasonal allergic rhinitis due to pollen    Gastroesophageal reflux disease with esophagitis without hemorrhage  -     sucralfate " (CARAFATE) 1 gram tablet; Take 1 tablet (1 g total) by mouth 4 (four) times a day.    Irritable bowel syndrome with constipation    Annual exam.  Daily routine discussed at length peer recommend 3 meals per day no snacking.  Recommend regular low impact exercise.  Patient continue limit caffeine alcohol  Allergic rhinitis.  Patient continue with desensitization shots  For esophageal reflux.  Will give trial of Carafate.  Patient can start once a day and then advance  Irritable bowel Daily routine discussed

## 2022-06-01 LAB
ALBUMIN SERPL-MCNC: 4.2 G/DL (ref 3.6–5.1)
ALBUMIN/GLOB SERPL: 1.8 (CALC) (ref 1–2.5)
ALP SERPL-CCNC: 53 U/L (ref 31–125)
ALT SERPL-CCNC: 12 U/L (ref 6–29)
APPEARANCE UR: CLEAR
AST SERPL-CCNC: 22 U/L (ref 10–35)
BACTERIA #/AREA URNS HPF: NORMAL /HPF
BILIRUB SERPL-MCNC: 0.7 MG/DL (ref 0.2–1.2)
BILIRUB UR QL STRIP: NEGATIVE
BUN SERPL-MCNC: 14 MG/DL (ref 7–25)
BUN/CREAT SERPL: ABNORMAL (CALC) (ref 6–22)
CALCIUM SERPL-MCNC: 9.1 MG/DL (ref 8.6–10.2)
CHLORIDE SERPL-SCNC: 102 MMOL/L (ref 98–110)
CHOLEST SERPL-MCNC: 201 MG/DL
CHOLEST/HDLC SERPL: 2.3 (CALC)
CO2 SERPL-SCNC: 27 MMOL/L (ref 20–32)
COLOR UR: YELLOW
CREAT SERPL-MCNC: 0.71 MG/DL (ref 0.5–1.1)
ERYTHROCYTE [DISTWIDTH] IN BLOOD BY AUTOMATED COUNT: 11.9 % (ref 11–15)
GLOBULIN SER CALC-MCNC: 2.4 G/DL (CALC) (ref 1.9–3.7)
GLUCOSE SERPL-MCNC: 82 MG/DL (ref 65–99)
GLUCOSE UR QL STRIP: NEGATIVE
HCT VFR BLD AUTO: 38.8 % (ref 35–45)
HDLC SERPL-MCNC: 89 MG/DL
HGB BLD-MCNC: 13 G/DL (ref 11.7–15.5)
HGB UR QL STRIP: NEGATIVE
HYALINE CASTS #/AREA URNS LPF: NORMAL /LPF
KETONES UR QL STRIP: NEGATIVE
LDLC SERPL CALC-MCNC: 97 MG/DL (CALC)
LEUKOCYTE ESTERASE UR QL STRIP: NEGATIVE
MCH RBC QN AUTO: 29 PG (ref 27–33)
MCHC RBC AUTO-ENTMCNC: 33.5 G/DL (ref 32–36)
MCV RBC AUTO: 86.6 FL (ref 80–100)
NITRITE UR QL STRIP: NEGATIVE
NONHDLC SERPL-MCNC: 112 MG/DL (CALC)
PH UR STRIP: 7 [PH] (ref 5–8)
PLATELET # BLD AUTO: 294 THOUSAND/UL (ref 140–400)
PMV BLD REES-ECKER: 10.6 FL (ref 7.5–12.5)
POTASSIUM SERPL-SCNC: 4.7 MMOL/L (ref 3.5–5.3)
PROT SERPL-MCNC: 6.6 G/DL (ref 6.1–8.1)
PROT UR QL STRIP: NEGATIVE
RBC # BLD AUTO: 4.48 MILLION/UL (ref 3.8–5.1)
RBC #/AREA URNS HPF: NORMAL /HPF
SODIUM SERPL-SCNC: 134 MMOL/L (ref 135–146)
SP GR UR STRIP: 1 (ref 1–1.03)
SQUAMOUS #/AREA URNS HPF: NORMAL /HPF
TRIGL SERPL-MCNC: 67 MG/DL
WBC # BLD AUTO: 4.8 THOUSAND/UL (ref 3.8–10.8)
WBC #/AREA URNS HPF: NORMAL /HPF

## 2022-06-16 ENCOUNTER — OFFICE VISIT (OUTPATIENT)
Dept: FAMILY MEDICINE | Facility: CLINIC | Age: 47
End: 2022-06-16
Payer: COMMERCIAL

## 2022-06-16 VITALS
HEIGHT: 61 IN | BODY MASS INDEX: 21.34 KG/M2 | HEART RATE: 68 BPM | RESPIRATION RATE: 18 BRPM | TEMPERATURE: 97.3 F | WEIGHT: 113 LBS | SYSTOLIC BLOOD PRESSURE: 110 MMHG | OXYGEN SATURATION: 99 % | DIASTOLIC BLOOD PRESSURE: 70 MMHG

## 2022-06-16 DIAGNOSIS — N30.00 ACUTE CYSTITIS WITHOUT HEMATURIA: Primary | ICD-10-CM

## 2022-06-16 PROCEDURE — 3008F BODY MASS INDEX DOCD: CPT | Performed by: FAMILY MEDICINE

## 2022-06-16 PROCEDURE — 99213 OFFICE O/P EST LOW 20 MIN: CPT | Performed by: FAMILY MEDICINE

## 2022-06-16 RX ORDER — NITROFURANTOIN 25; 75 MG/1; MG/1
100 CAPSULE ORAL 2 TIMES DAILY
Qty: 10 CAPSULE | Refills: 0 | Status: SHIPPED | OUTPATIENT
Start: 2022-06-16 | End: 2022-06-21

## 2022-06-16 ASSESSMENT — ENCOUNTER SYMPTOMS
VOMITING: 0
NAUSEA: 0
FEVER: 0
BLOOD IN STOOL: 0
FREQUENCY: 1
DYSURIA: 1
DIARRHEA: 0
CONSTIPATION: 0
FATIGUE: 0

## 2022-06-16 NOTE — PROGRESS NOTES
"      Subjective      Patient ID: Sandra Albrecht is a 46 y.o. female.  1975      Patient complaining of left lateral abdominal pain and possibly some dysuria for the last few days.  No nausea vomiting diarrhea.  Patient not taking any new medication.  Patient is on sucralfate for her acid reflux      The following have been reviewed and updated as appropriate in this visit:          Review of Systems   Constitutional: Negative for fatigue and fever.   Gastrointestinal: Negative for blood in stool, constipation, diarrhea, nausea and vomiting.   Genitourinary: Positive for dysuria and frequency. Negative for pelvic pain.       Objective     Vitals:    06/16/22 1306   BP: 110/70   Pulse: 68   Resp: 18   Temp: 36.3 °C (97.3 °F)   TempSrc: Temporal   SpO2: 99%   Weight: 51.3 kg (113 lb)   Height: 1.549 m (5' 1\")     Body mass index is 21.35 kg/m².    Physical Exam  Vitals reviewed.   Constitutional:       Appearance: She is well-developed.   Eyes:      Conjunctiva/sclera: Conjunctivae normal.   Neck:      Thyroid: No thyromegaly.   Cardiovascular:      Rate and Rhythm: Normal rate and regular rhythm.      Heart sounds: Normal heart sounds. No murmur heard.  Pulmonary:      Effort: Pulmonary effort is normal.      Breath sounds: Normal breath sounds. No wheezing or rales.   Abdominal:      General: Bowel sounds are normal. There is no distension.      Palpations: Abdomen is soft. There is no mass.      Tenderness: There is abdominal tenderness. There is no right CVA tenderness, left CVA tenderness or rebound.       Musculoskeletal:      Cervical back: Normal range of motion.   Lymphadenopathy:      Cervical: No cervical adenopathy.   Neurological:      Mental Status: She is alert.         Assessment/Plan   Diagnoses and all orders for this visit:    Acute cystitis without hematuria (Primary)  -     Urinalysis with Reflex Culture; Future  -     nitrofurantoin, macrocrystal-monohydrate, (MACROBID) 100 mg capsule; Take 1 " capsule (100 mg total) by mouth 2 (two) times a day for 5 days.    Cystitis.  Probable cystitis we will check urine sample.  Patient will start Macrobid empirically.  Lateral abdominal pain patient may give trial of Advil 3 times a day.

## 2022-06-17 LAB
APPEARANCE UR: CLEAR
BACTERIA #/AREA URNS HPF: NORMAL /HPF
BACTERIA UR CULT: NORMAL
BILIRUB UR QL STRIP: NEGATIVE
COLOR UR: YELLOW
GLUCOSE UR QL STRIP: NEGATIVE
HGB UR QL STRIP: NEGATIVE
HYALINE CASTS #/AREA URNS LPF: NORMAL /LPF
KETONES UR QL STRIP: NEGATIVE
LEUKOCYTE ESTERASE UR QL STRIP: NEGATIVE
NITRITE UR QL STRIP: NEGATIVE
PH UR STRIP: 6.5 [PH] (ref 5–8)
PROT UR QL STRIP: NEGATIVE
RBC #/AREA URNS HPF: NORMAL /HPF
SP GR UR STRIP: 1 (ref 1–1.03)
SQUAMOUS #/AREA URNS HPF: NORMAL /HPF
WBC #/AREA URNS HPF: NORMAL /HPF

## 2022-08-30 ENCOUNTER — TRANSCRIBE ORDERS (OUTPATIENT)
Dept: SCHEDULING | Age: 47
End: 2022-08-30

## 2022-08-30 DIAGNOSIS — Z12.31 ENCOUNTER FOR SCREENING MAMMOGRAM FOR MALIGNANT NEOPLASM OF BREAST: Primary | ICD-10-CM

## 2022-08-31 ENCOUNTER — HOSPITAL ENCOUNTER (OUTPATIENT)
Dept: RADIOLOGY | Age: 47
Discharge: HOME | End: 2022-08-31
Attending: OBSTETRICS & GYNECOLOGY
Payer: COMMERCIAL

## 2022-08-31 DIAGNOSIS — Z12.31 ENCOUNTER FOR SCREENING MAMMOGRAM FOR MALIGNANT NEOPLASM OF BREAST: ICD-10-CM

## 2022-08-31 PROCEDURE — 77067 SCR MAMMO BI INCL CAD: CPT

## 2022-08-31 PROCEDURE — 77063 BREAST TOMOSYNTHESIS BI: CPT

## 2022-11-07 ENCOUNTER — OFFICE VISIT (OUTPATIENT)
Dept: FAMILY MEDICINE | Facility: CLINIC | Age: 47
End: 2022-11-07
Payer: COMMERCIAL

## 2022-11-07 VITALS
OXYGEN SATURATION: 99 % | HEIGHT: 61 IN | SYSTOLIC BLOOD PRESSURE: 112 MMHG | WEIGHT: 113 LBS | TEMPERATURE: 98.4 F | DIASTOLIC BLOOD PRESSURE: 74 MMHG | HEART RATE: 75 BPM | BODY MASS INDEX: 21.34 KG/M2 | RESPIRATION RATE: 14 BRPM

## 2022-11-07 DIAGNOSIS — R10.9 FLANK PAIN: Primary | ICD-10-CM

## 2022-11-07 PROCEDURE — 99214 OFFICE O/P EST MOD 30 MIN: CPT | Performed by: NURSE PRACTITIONER

## 2022-11-07 PROCEDURE — 3008F BODY MASS INDEX DOCD: CPT | Performed by: NURSE PRACTITIONER

## 2022-11-07 ASSESSMENT — ENCOUNTER SYMPTOMS
HEMATURIA: 0
HEADACHES: 0
FATIGUE: 0
DIFFICULTY URINATING: 0
DIZZINESS: 0
COUGH: 0
BACK PAIN: 1
CHILLS: 0
ABDOMINAL PAIN: 1
PALPITATIONS: 0
WHEEZING: 0
FEVER: 0
SHORTNESS OF BREATH: 0
UNEXPECTED WEIGHT CHANGE: 0
LIGHT-HEADEDNESS: 0

## 2022-11-07 NOTE — PROGRESS NOTES
"Subjective      Patient ID: Sandra Albrecht is a 47 y.o. female.    Pt c/o intermittent left sided back and side discomfort. Full sensation-balloon. Could be gone for a day and return other times can go a whole week without feeling discomfort. Denies pain. Aggravated by movement. Denies changes in bms associated with the sensation. Followed by GI due to h/o GI issues. Has seen GI for this-does not feel related. Pt with h/o kidney infection in the past but denies h/o kidney stone. Denies dysuria, urinary frequency, blook in urine. Left flank left side. Gas bubble. Doesn't coincide with anything GI related- saw GI. GYN did note possible cyst on exam-US confirmed ovarian cyst. Denies fever, body aches, chills.         The following have been reviewed and updated as appropriate in this visit:        Review of Systems   Constitutional: Negative for chills, fatigue, fever and unexpected weight change.   Eyes: Negative for visual disturbance.   Respiratory: Negative for cough, shortness of breath and wheezing.    Cardiovascular: Negative for chest pain, palpitations and leg swelling.   Gastrointestinal: Positive for abdominal pain (side). Constipation: at baseline.   Genitourinary: Negative for difficulty urinating, hematuria, pelvic pain, vaginal discharge and vaginal pain.   Musculoskeletal: Positive for back pain.   Neurological: Negative for dizziness, light-headedness and headaches.       Objective     Vitals:    11/07/22 1127   BP: 112/74   Pulse: 75   Resp: 14   Temp: 36.9 °C (98.4 °F)   TempSrc: Temporal   SpO2: 99%   Weight: 51.3 kg (113 lb)   Height: 1.549 m (5' 1\")     Body mass index is 21.35 kg/m².    Allergies   Allergen Reactions    Aminoglycosides      Other reaction(s): UNKNOWN  neosporin rash     Bacitracin      Other reaction(s): UNKNOWN  neosporin rash     Polymyxin B Sulfate Other (see comments)       Current Outpatient Medications:     cetirizine (ZyrTEC) 10 mg tablet, Take 1 tablet (10 mg total) by " mouth daily., Disp: 90 tablet, Rfl: 1    famotidine (PEPCID) 10 mg tablet, Take 10 mg by mouth 2 (two) times a day., Disp: , Rfl:     tretinoin (RETIN-A) 0.05 % cream, APPLY TO FACE EVERY EVENING, Disp: , Rfl:     Past Medical History:   Diagnosis Date    Mastalgia 3/23/2021     Social History     Socioeconomic History    Marital status:    Occupational History    Occupation: teacher [employer]   Tobacco Use    Smoking status: Never    Smokeless tobacco: Never   Substance and Sexual Activity    Alcohol use: Yes     Past Surgical History:   Procedure Laterality Date    COLONOSCOPY  10/01/2020    UPPER GASTROINTESTINAL ENDOSCOPY  04/06/2018    Gastropathy    UPPER GASTROINTESTINAL ENDOSCOPY  08/02/2021     Family History   Problem Relation Age of Onset    Hypertension Biological Mother     Hypertension Biological Father     Colon cancer Maternal Grandmother        Physical Exam  Constitutional:       General: She is not in acute distress.     Appearance: She is well-developed and well-nourished.   Neck:      Vascular: No carotid bruit.   Cardiovascular:      Rate and Rhythm: Normal rate and regular rhythm.      Pulses: Intact distal pulses.      Heart sounds: Normal heart sounds.   Pulmonary:      Effort: Pulmonary effort is normal.      Breath sounds: Normal breath sounds. No wheezing.   Abdominal:      General: Abdomen is flat. Bowel sounds are normal.      Palpations: Abdomen is soft.      Tenderness: There is no abdominal tenderness. There is no right CVA tenderness, left CVA tenderness, guarding or rebound.      Hernia: No hernia is present.   Musculoskeletal:      Cervical back: Normal range of motion and neck supple.      Right lower leg: No edema.      Left lower leg: No edema.   Skin:     General: Skin is warm and dry.      Findings: No rash.          Neurological:      General: No focal deficit present.      Mental Status: She is alert.         Assessment/Plan   Problem List Items  Addressed This Visit    None  Visit Diagnoses     Flank pain    -  Primary    Ua/UC  Will check US  Voltaren gel twice a day to the area for 1-2 weeks (cont to avoid nsaids due to GERD)  Warm/cool comrpesses can alternate    Relevant Orders    ULTRASOUND KIDNEYS    US PELVIS TRANSABDOMINAL & TRANSVAGINAL    Urinalysis with microscopic    Urine culture          Written education and action steps suggested to the patient are documented in After Visit Summary / Patient Instructions sections of this encounter.    11/7/2022  PATRICIA Angulo

## 2022-11-08 NOTE — PATIENT INSTRUCTIONS
Problem List Items Addressed This Visit    None  Visit Diagnoses       Flank pain    -  Primary    Ua/UC  Will check US  Voltaren gel twice a day to the area for 1-2 weeks (cont to avoid nsaids due to GERD)  Warm/cool comrpesses can alternate    Relevant Orders    ULTRASOUND KIDNEYS    US PELVIS TRANSABDOMINAL & TRANSVAGINAL    Urinalysis with microscopic    Urine culture

## 2022-11-11 LAB
APPEARANCE UR: CLEAR
BACTERIA #/AREA URNS HPF: NORMAL /HPF
BACTERIA UR CULT: ABNORMAL
BILIRUB UR QL STRIP: NEGATIVE
COLOR UR: YELLOW
GLUCOSE UR QL STRIP: NEGATIVE
HGB UR QL STRIP: NEGATIVE
HYALINE CASTS #/AREA URNS LPF: NORMAL /LPF
KETONES UR QL STRIP: NEGATIVE
LEUKOCYTE ESTERASE UR QL STRIP: NEGATIVE
NITRITE UR QL STRIP: NEGATIVE
PH UR STRIP: 7 [PH] (ref 5–8)
PROT UR QL STRIP: NEGATIVE
RBC #/AREA URNS HPF: NORMAL /HPF
SERVICE CMNT-IMP: NORMAL
SP GR UR STRIP: 1.01 (ref 1–1.03)
SQUAMOUS #/AREA URNS HPF: NORMAL /HPF
WBC #/AREA URNS HPF: NORMAL /HPF

## 2022-11-11 RX ORDER — NITROFURANTOIN 25; 75 MG/1; MG/1
100 CAPSULE ORAL 2 TIMES DAILY
Qty: 10 CAPSULE | Refills: 0 | Status: SHIPPED | OUTPATIENT
Start: 2022-11-11 | End: 2022-11-16

## 2022-11-21 ENCOUNTER — HOSPITAL ENCOUNTER (OUTPATIENT)
Dept: RADIOLOGY | Age: 47
Discharge: HOME | End: 2022-11-21
Attending: NURSE PRACTITIONER
Payer: COMMERCIAL

## 2022-11-21 DIAGNOSIS — R10.9 FLANK PAIN: ICD-10-CM

## 2022-11-21 PROCEDURE — 76775 US EXAM ABDO BACK WALL LIM: CPT

## 2022-11-21 PROCEDURE — 76830 TRANSVAGINAL US NON-OB: CPT

## 2023-04-18 PROBLEM — K20.0 EOSINOPHILIC ESOPHAGITIS: Status: ACTIVE | Noted: 2023-04-18

## 2023-04-24 ENCOUNTER — OFFICE VISIT (OUTPATIENT)
Dept: FAMILY MEDICINE | Facility: CLINIC | Age: 48
End: 2023-04-24
Payer: COMMERCIAL

## 2023-04-24 VITALS
HEIGHT: 61 IN | BODY MASS INDEX: 21.52 KG/M2 | WEIGHT: 114 LBS | RESPIRATION RATE: 12 BRPM | SYSTOLIC BLOOD PRESSURE: 112 MMHG | OXYGEN SATURATION: 97 % | DIASTOLIC BLOOD PRESSURE: 64 MMHG | TEMPERATURE: 97.7 F | HEART RATE: 80 BPM

## 2023-04-24 DIAGNOSIS — R10.9 FLANK PAIN: Primary | ICD-10-CM

## 2023-04-24 PROBLEM — K21.9 GASTROESOPHAGEAL REFLUX DISEASE WITHOUT ESOPHAGITIS: Status: ACTIVE | Noted: 2017-01-30

## 2023-04-24 PROBLEM — K22.4 DIFFUSE SPASM OF ESOPHAGUS: Status: ACTIVE | Noted: 2017-01-30

## 2023-04-24 PROCEDURE — 3008F BODY MASS INDEX DOCD: CPT | Performed by: FAMILY MEDICINE

## 2023-04-24 PROCEDURE — 99213 OFFICE O/P EST LOW 20 MIN: CPT | Performed by: FAMILY MEDICINE

## 2023-04-24 ASSESSMENT — ENCOUNTER SYMPTOMS
JOINT SWELLING: 0
BACK PAIN: 0
CONSTIPATION: 0
WEAKNESS: 0
ARTHRALGIAS: 0
DIARRHEA: 0
NUMBNESS: 0
MYALGIAS: 1
NAUSEA: 0

## 2023-04-24 NOTE — PROGRESS NOTES
"      Subjective      Patient ID: Sandra Albrecht is a 47 y.o. female.  1975      Patient complaining of left flank discomfort.  She has been area between iliac crest and lower rib cage.  Seems to improve with exercise but can be a particularly discomfort with sitting especially if she moves or sitting position.  Patient had evaluation approximately 6 months recall included ultrasound of pelvis and left kidney that was unremarkable.  Patient takes Advil from time to time.  Not much relief however she does not if she takes it for protracted periods it will upset her stomach.      The following have been reviewed and updated as appropriate in this visit:        Review of Systems   Gastrointestinal: Negative for constipation, diarrhea and nausea.   Genitourinary: Negative for menstrual problem.   Musculoskeletal: Positive for myalgias. Negative for arthralgias, back pain, gait problem and joint swelling.   Skin: Negative for rash.   Neurological: Negative for weakness and numbness.       Objective     Vitals:    04/24/23 1106   BP: 112/64   Pulse: 80   Resp: 12   Temp: 36.5 °C (97.7 °F)   TempSrc: Oral   SpO2: 97%   Weight: 51.7 kg (114 lb)   Height: 1.549 m (5' 1\")     Body mass index is 21.54 kg/m².    Physical Exam  Vitals reviewed.   Constitutional:       General: She is not in acute distress.     Appearance: She is not diaphoretic.   Abdominal:      General: Bowel sounds are normal.      Palpations: Abdomen is soft.      Tenderness: There is no abdominal tenderness.   Musculoskeletal:      Lumbar back: Spasms and tenderness present. No deformity or bony tenderness.      Left hip: Normal.      Comments: Mild tenderness to area above the iliac crest on left lateral side.   Skin:     Findings: No rash.   Neurological:      Mental Status: She is alert.      Sensory: No sensory deficit.      Comments: Straight leg raise test negative bilaterally         Assessment/Plan   Diagnoses and all orders for this " visit:    Flank pain (Primary)  -     Ambulatory referral to Physical Therapy; Future    Flank pain suspect muscular origin.  We will patient do physical therapy.  Patient to call when this is completed for update

## 2023-06-12 NOTE — TELEPHONE ENCOUNTER
Medicine Refill Request    Last Office: 4/24/2023   Last Consult Visit: Visit date not found  Last Telemedicine Visit: Visit date not found    Next Appointment: Visit date not found      Current Outpatient Medications:   •  cetirizine (ZyrTEC) 10 mg tablet, Take 1 tablet (10 mg total) by mouth daily., Disp: 90 tablet, Rfl: 1  •  famotidine (PEPCID) 10 mg tablet, Take 10 mg by mouth 2 (two) times a day., Disp: , Rfl:   •  tretinoin (RETIN-A) 0.05 % cream, APPLY TO FACE EVERY EVENING, Disp: , Rfl:       BP Readings from Last 3 Encounters:   04/24/23 112/64   11/07/22 112/74   06/16/22 110/70       Recent Lab results:  Lab Results   Component Value Date    CHOL 201 (H) 05/31/2022   ,   Lab Results   Component Value Date    HDL 89 05/31/2022   ,   Lab Results   Component Value Date    LDLCALC 97 05/31/2022   ,   Lab Results   Component Value Date    TRIG 67 05/31/2022        Lab Results   Component Value Date    GLUCOSE NEGATIVE 11/07/2022   , No results found for: HGBA1C      Lab Results   Component Value Date    CREATININE 0.71 05/31/2022       Lab Results   Component Value Date    TSH 0.93 12/09/2017

## 2023-06-14 ENCOUNTER — TELEPHONE (OUTPATIENT)
Dept: FAMILY MEDICINE | Facility: CLINIC | Age: 48
End: 2023-06-14
Payer: COMMERCIAL

## 2023-06-15 RX ORDER — VALACYCLOVIR HYDROCHLORIDE 1 G/1
2000 TABLET, FILM COATED ORAL 2 TIMES DAILY
Qty: 20 TABLET | Refills: 0 | Status: SHIPPED | OUTPATIENT
Start: 2023-06-15 | End: 2025-03-12 | Stop reason: SDUPTHER

## 2023-09-08 ENCOUNTER — HOSPITAL ENCOUNTER (OUTPATIENT)
Dept: RADIOLOGY | Age: 48
Discharge: HOME | End: 2023-09-08
Attending: OBSTETRICS & GYNECOLOGY
Payer: COMMERCIAL

## 2023-09-08 ENCOUNTER — TRANSCRIBE ORDERS (OUTPATIENT)
Dept: RADIOLOGY | Age: 48
End: 2023-09-08

## 2023-09-08 DIAGNOSIS — Z12.31 ENCOUNTER FOR SCREENING MAMMOGRAM FOR MALIGNANT NEOPLASM OF BREAST: Primary | ICD-10-CM

## 2023-09-08 DIAGNOSIS — Z12.31 ENCOUNTER FOR SCREENING MAMMOGRAM FOR MALIGNANT NEOPLASM OF BREAST: ICD-10-CM

## 2023-09-08 PROCEDURE — 77063 BREAST TOMOSYNTHESIS BI: CPT

## 2023-12-13 ENCOUNTER — TELEPHONE (OUTPATIENT)
Dept: FAMILY MEDICINE | Facility: CLINIC | Age: 48
End: 2023-12-13
Payer: COMMERCIAL

## 2023-12-14 ENCOUNTER — OFFICE VISIT (OUTPATIENT)
Dept: FAMILY MEDICINE | Facility: CLINIC | Age: 48
End: 2023-12-14
Payer: COMMERCIAL

## 2023-12-14 VITALS
OXYGEN SATURATION: 99 % | BODY MASS INDEX: 21.34 KG/M2 | RESPIRATION RATE: 12 BRPM | HEART RATE: 90 BPM | SYSTOLIC BLOOD PRESSURE: 120 MMHG | WEIGHT: 113 LBS | DIASTOLIC BLOOD PRESSURE: 78 MMHG | HEIGHT: 61 IN | TEMPERATURE: 97.8 F

## 2023-12-14 DIAGNOSIS — M79.661 PAIN OF RIGHT CALF: ICD-10-CM

## 2023-12-14 DIAGNOSIS — K09.0: Primary | ICD-10-CM

## 2023-12-14 DIAGNOSIS — R00.2 PALPITATIONS: ICD-10-CM

## 2023-12-14 PROCEDURE — 3008F BODY MASS INDEX DOCD: CPT | Performed by: FAMILY MEDICINE

## 2023-12-14 PROCEDURE — 99214 OFFICE O/P EST MOD 30 MIN: CPT | Performed by: FAMILY MEDICINE

## 2023-12-14 ASSESSMENT — ENCOUNTER SYMPTOMS
SORE THROAT: 0
PALPITATIONS: 1
NAUSEA: 0
APPETITE CHANGE: 0
ACTIVITY CHANGE: 0
COUGH: 0
TROUBLE SWALLOWING: 0
CHEST TIGHTNESS: 0
FATIGUE: 0
VOMITING: 0
SHORTNESS OF BREATH: 0

## 2023-12-14 NOTE — PROGRESS NOTES
"      Subjective      Patient ID: Sandra Albrecht is a 48 y.o. female.  1975      Patient with 3 concerns.  Patient has noted a \"lesion\" on her lower gumline for a few weeks.  Seems to be firm and nontender.  Patient notes no odontalgia  Unrelated patient has noted some soreness discomfort to her right calf area.  She does note she has some minor varicosities.  She does use support hose.  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  Lastly patient notes occasional palpitation with exercise.  She notes that sometimes at rest or if she pushes herself in terms of heart or exercise.  Patient drinks about 2 cups of coffee a day.      The following have been reviewed and updated as appropriate in this visit:   Allergies  Meds  Problems       Review of Systems   Constitutional: Negative for activity change, appetite change and fatigue.   HENT: Negative for sore throat and trouble swallowing.    Respiratory: Negative for cough, chest tightness and shortness of breath.    Cardiovascular: Positive for palpitations. Negative for chest pain.   Gastrointestinal: Negative for nausea and vomiting.       Objective     Vitals:    12/14/23 1525   BP: 120/78   Pulse: 90   Resp: 12   Temp: 36.6 °C (97.8 °F)   TempSrc: Oral   SpO2: 99%   Weight: 51.3 kg (113 lb)   Height: 1.549 m (5' 1\")     Body mass index is 21.35 kg/m².    Physical Exam  Vitals reviewed.   Constitutional:       Appearance: She is well-developed.   HENT:      Right Ear: Tympanic membrane, ear canal and external ear normal.      Left Ear: Tympanic membrane, ear canal and external ear normal.      Mouth/Throat:        Comments: Gumline 5 mm firm cyst on lower area  Eyes:      Conjunctiva/sclera: Conjunctivae normal.      Pupils: Pupils are equal, round, and reactive to light.   Neck:      Thyroid: No thyromegaly.   Cardiovascular:      Rate and Rhythm: Normal rate and regular rhythm.      Heart sounds: " Normal heart sounds. No murmur heard.  Pulmonary:      Effort: Pulmonary effort is normal.      Breath sounds: Normal breath sounds. No wheezing or rales.   Abdominal:      General: Bowel sounds are normal.      Palpations: Abdomen is soft.   Musculoskeletal:      Cervical back: Normal range of motion.      Right knee: Normal.      Right lower leg: No swelling, deformity or tenderness.      Right ankle: Normal.   Lymphadenopathy:      Cervical: No cervical adenopathy.   Neurological:      Mental Status: She is alert.         Assessment/Plan   Diagnoses and all orders for this visit:    Gingival cyst of adult (Primary)    Pain of right calf    Palpitations    Gingival cyst.  No intervention patient does have upcoming dental appointment  Right calf area discomfort.  Patient hold off exercise for 1 week and give trial of Advil 2 tablets 3 times a day for 1 week.  Palpitations.  Will have patient decrease caffeine to 1 drink a day

## 2024-04-16 ENCOUNTER — TELEPHONE (OUTPATIENT)
Dept: FAMILY MEDICINE | Facility: CLINIC | Age: 49
End: 2024-04-16
Payer: COMMERCIAL

## 2024-04-16 NOTE — TELEPHONE ENCOUNTER
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

## 2024-04-17 ENCOUNTER — CLINICAL SUPPORT (OUTPATIENT)
Dept: FAMILY MEDICINE | Facility: CLINIC | Age: 49
End: 2024-04-17
Payer: COMMERCIAL

## 2024-04-17 DIAGNOSIS — Z23 NEED FOR TDAP VACCINATION: Primary | ICD-10-CM

## 2024-04-17 PROCEDURE — 90715 TDAP VACCINE 7 YRS/> IM: CPT | Performed by: FAMILY MEDICINE

## 2024-04-17 PROCEDURE — 90471 IMMUNIZATION ADMIN: CPT | Performed by: FAMILY MEDICINE

## 2024-09-09 ENCOUNTER — TRANSCRIBE ORDERS (OUTPATIENT)
Dept: RADIOLOGY | Age: 49
End: 2024-09-09

## 2024-09-09 ENCOUNTER — HOSPITAL ENCOUNTER (OUTPATIENT)
Dept: RADIOLOGY | Age: 49
Discharge: HOME | End: 2024-09-09
Attending: OBSTETRICS & GYNECOLOGY
Payer: COMMERCIAL

## 2024-09-09 DIAGNOSIS — Z12.31 ENCOUNTER FOR SCREENING MAMMOGRAM FOR MALIGNANT NEOPLASM OF BREAST: Primary | ICD-10-CM

## 2024-09-09 DIAGNOSIS — Z12.31 ENCOUNTER FOR SCREENING MAMMOGRAM FOR MALIGNANT NEOPLASM OF BREAST: ICD-10-CM

## 2024-09-09 PROCEDURE — 77063 BREAST TOMOSYNTHESIS BI: CPT

## 2025-01-24 ENCOUNTER — TELEPHONE (OUTPATIENT)
Dept: FAMILY MEDICINE | Facility: CLINIC | Age: 50
End: 2025-01-24
Payer: COMMERCIAL

## 2025-01-24 NOTE — TELEPHONE ENCOUNTER
Pt. Lvm stating having blood vessel problems and would like to speak to someone about seeing a specialist. Would like a call back.

## 2025-01-27 NOTE — TELEPHONE ENCOUNTER
Patient called - states she is getting large burst of blood vessels in her feet. Wants to see a specialist but not sure where to go. Would like a call back.

## 2025-01-30 NOTE — TELEPHONE ENCOUNTER
Pt lvm stating would like to talk to someone about a specialist to see. Pt did not state what type of specialist just would like a call back for advice.

## 2025-03-12 ENCOUNTER — OFFICE VISIT (OUTPATIENT)
Dept: FAMILY MEDICINE | Facility: CLINIC | Age: 50
End: 2025-03-12
Payer: COMMERCIAL

## 2025-03-12 VITALS
HEART RATE: 71 BPM | SYSTOLIC BLOOD PRESSURE: 114 MMHG | HEIGHT: 60 IN | WEIGHT: 114 LBS | BODY MASS INDEX: 22.38 KG/M2 | OXYGEN SATURATION: 98 % | DIASTOLIC BLOOD PRESSURE: 70 MMHG | RESPIRATION RATE: 12 BRPM | TEMPERATURE: 97.4 F

## 2025-03-12 DIAGNOSIS — R35.0 URINARY FREQUENCY: Primary | ICD-10-CM

## 2025-03-12 LAB
BILIRUBIN, POC: NEGATIVE
BLOOD URINE, POC: NEGATIVE
CLARITY, POC: CLEAR
COLOR, POC: COLORLESS
EXPIRATION DATE: NORMAL
GLUCOSE URINE, POC: NEGATIVE
KETONES, POC: NEGATIVE
LEUKOCYTE EST, POC: NEGATIVE
Lab: NORMAL
NITRITE, POC: NEGATIVE
PH, POC: 5
POCT MANUFACTURER: NORMAL
PROTEIN, POC: NORMAL
SPECIFIC GRAVITY, POC: 1
UROBILINOGEN, POC: >=8

## 2025-03-12 PROCEDURE — 99213 OFFICE O/P EST LOW 20 MIN: CPT | Performed by: FAMILY MEDICINE

## 2025-03-12 PROCEDURE — 3008F BODY MASS INDEX DOCD: CPT | Performed by: FAMILY MEDICINE

## 2025-03-12 PROCEDURE — 81002 URINALYSIS NONAUTO W/O SCOPE: CPT | Performed by: FAMILY MEDICINE

## 2025-03-12 RX ORDER — VALACYCLOVIR HYDROCHLORIDE 1 G/1
2000 TABLET, FILM COATED ORAL 2 TIMES DAILY
Qty: 20 TABLET | Refills: 0 | Status: SHIPPED | OUTPATIENT
Start: 2025-03-12

## 2025-03-13 LAB
APPEARANCE UR: CLEAR
BACTERIA #/AREA URNS HPF: NORMAL /HPF
BACTERIA UR CULT: NORMAL
BILIRUB UR QL STRIP: NEGATIVE
COLOR UR: YELLOW
GLUCOSE UR QL STRIP: NEGATIVE
HGB UR QL STRIP: NEGATIVE
HYALINE CASTS #/AREA URNS LPF: NORMAL /LPF
KETONES UR QL STRIP: NEGATIVE
LEUKOCYTE ESTERASE UR QL STRIP: NEGATIVE
NITRITE UR QL STRIP: NEGATIVE
PH UR STRIP: 6 [PH] (ref 5–8)
PROT UR QL STRIP: NEGATIVE
RBC #/AREA URNS HPF: NORMAL /HPF
SERVICE CMNT-IMP: NORMAL
SP GR UR STRIP: 1.01 (ref 1–1.03)
SQUAMOUS #/AREA URNS HPF: NORMAL /HPF
WBC #/AREA URNS HPF: NORMAL /HPF

## 2025-06-25 ENCOUNTER — TELEPHONE (OUTPATIENT)
Dept: FAMILY MEDICINE | Facility: CLINIC | Age: 50
End: 2025-06-25
Payer: COMMERCIAL

## 2025-06-25 NOTE — TELEPHONE ENCOUNTER
Pt called and states that she was bit by a tick and she has concerns about Lyme disease.    She did state that she has an appointment coming up in the next two weeks.    She would like to know about a test for Lymes disease. If a lab order can be placed in for her- she goes to [facility].    If medication is needed she confirmed that she uses [facility] [place]    CB#  [phone]

## 2025-06-26 NOTE — TELEPHONE ENCOUNTER
Lm - returning call to review/ can ck Lyme titer at appt in 2 wks/ if tick bite within last 24 hrs rec Rx Doxycyclline 100 mg # 2/ prophylactic dose/

## 2025-07-01 NOTE — PROGRESS NOTES
Going to [place] for 2-3 weeks    PLAN  --Oral typhoid  --Malarone  --Azithro prn  --Discussed other standard travel precautions   Subjective      Patient ID: Sandra Albrecht is a 49 y.o. female.    48 yo F here w/ ? UTI    About 1 wk ago had some cramping pelvic area, some burning and ferquency but sx were intermittent  For past 48 hrs, sx have been more consistent - incr frequency, still some burning, discomfort.  No hematuria, no change in color or smell.  No flank pain  No n/v  No f/c  Hasn't tried anything for sx  Hasn't had UTI in yrs- felt similar    Pt traveling 3/21.    Wondering about Shingrix vx    UA dipstick results: colorless, clear  Spec grav - 1.005  pH - 5  Leuk - neg  Nitrites - neg  Prot - trace  Gluc - nml  Ket - neg  UroBili - nml  Bili - neg  Bld -neg           The following have been reviewed and updated as appropriate in this visit:   Meds       Review of Systems    Objective     Vitals:    03/12/25 1030   BP: 114/70   BP Location: Left upper arm   Patient Position: Sitting   Pulse: 71   Resp: 12   Temp: 36.3 °C (97.4 °F)   TempSrc: Temporal   SpO2: 98%   Weight: 51.7 kg (114 lb)   Height: 1.524 m (5')     Body mass index is 22.26 kg/m².    Physical Exam  Vitals reviewed.   Constitutional:       Appearance: She is well-developed.   HENT:      Head: Normocephalic and atraumatic.      Right Ear: External ear normal.      Left Ear: External ear normal.      Mouth/Throat:      Pharynx: No oropharyngeal exudate.   Eyes:      Conjunctiva/sclera: Conjunctivae normal.      Pupils: Pupils are equal, round, and reactive to light.   Cardiovascular:      Rate and Rhythm: Normal rate.      Pulses:           Radial pulses are 2+ on the right side and 2+ on the left side.        Dorsalis pedis pulses are 2+ on the right side and 2+ on the left side.        Posterior tibial pulses are 2+ on the right side and 2+ on the left side.      Heart sounds: Normal heart sounds. No murmur heard.  Pulmonary:      Effort: Pulmonary effort is normal.      Breath sounds: Normal breath sounds.   Abdominal:      General: Bowel sounds are normal.       Palpations: Abdomen is soft.      Tenderness: There is no abdominal tenderness. There is no right CVA tenderness, left CVA tenderness, guarding or rebound.   Musculoskeletal:      Cervical back: Normal range of motion and neck supple.      Right lower leg: No edema.      Left lower leg: No edema.   Lymphadenopathy:      Head:      Right side of head: No submental, submandibular, tonsillar, preauricular, posterior auricular or occipital adenopathy.      Left side of head: No submental, submandibular, tonsillar, preauricular, posterior auricular or occipital adenopathy.      Cervical: No cervical adenopathy.      Upper Body:      Right upper body: No supraclavicular adenopathy.      Left upper body: No supraclavicular adenopathy.   Neurological:      Mental Status: She is alert and oriented to person, place, and time.   Psychiatric:         Behavior: Behavior normal.         Thought Content: Thought content normal.         Judgment: Judgment normal.         Assessment/Plan       Diagnosis Plan   1. Urinary frequency  Urinalysis with Reflex Culture (ED and Outpatient only)    UA unremarkable  Will send UCx - wait for results to treat, but call if sx worsen  Stay well hydrated, can try cranberry juice or supplements  Pt v/u

## 2025-07-10 ENCOUNTER — OFFICE VISIT (OUTPATIENT)
Dept: FAMILY MEDICINE | Facility: CLINIC | Age: 50
End: 2025-07-10
Payer: COMMERCIAL

## 2025-07-10 VITALS
BODY MASS INDEX: 22.38 KG/M2 | OXYGEN SATURATION: 98 % | HEART RATE: 73 BPM | RESPIRATION RATE: 16 BRPM | WEIGHT: 114 LBS | DIASTOLIC BLOOD PRESSURE: 60 MMHG | HEIGHT: 60 IN | SYSTOLIC BLOOD PRESSURE: 102 MMHG | TEMPERATURE: 97.3 F

## 2025-07-10 DIAGNOSIS — N92.0 MENORRHAGIA WITH REGULAR CYCLE: ICD-10-CM
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
## 2025-07-10 DIAGNOSIS — S80.869A TICK BITE OF LOWER LEG, UNSPECIFIED LATERALITY, INITIAL ENCOUNTER: ICD-10-CM

## 2025-07-10 DIAGNOSIS — Z00.00 ENCOUNTER FOR GENERAL ADULT MEDICAL EXAMINATION WITHOUT ABNORMAL FINDINGS: Primary | ICD-10-CM

## 2025-07-10 DIAGNOSIS — K20.0 EOSINOPHILIC ESOPHAGITIS: ICD-10-CM

## 2025-07-10 DIAGNOSIS — I73.00 RAYNAUD'S DISEASE WITHOUT GANGRENE: ICD-10-CM

## 2025-07-10 DIAGNOSIS — K21.9 GASTROESOPHAGEAL REFLUX DISEASE WITHOUT ESOPHAGITIS: ICD-10-CM

## 2025-07-10 PROCEDURE — 99396 PREV VISIT EST AGE 40-64: CPT | Mod: 25 | Performed by: FAMILY MEDICINE

## 2025-07-10 PROCEDURE — 3008F BODY MASS INDEX DOCD: CPT | Performed by: FAMILY MEDICINE

## 2025-07-10 RX ORDER — OXYMETAZOLINE HYDROCHLORIDE OPHTHALMIC 1 MG/ML
SOLUTION/ DROPS OPHTHALMIC
COMMUNITY
Start: 2025-07-09

## 2025-07-10 ASSESSMENT — ENCOUNTER SYMPTOMS
TROUBLE SWALLOWING: 0
FEVER: 0
APPETITE CHANGE: 0
ARTHRALGIAS: 1
FREQUENCY: 0
NUMBNESS: 0
DIARRHEA: 0
SORE THROAT: 0
CONSTIPATION: 0
SINUS PRESSURE: 0
COUGH: 0
ACTIVITY CHANGE: 0
SINUS PAIN: 0
DIZZINESS: 0
JOINT SWELLING: 0
VOMITING: 0
ABDOMINAL PAIN: 0
DIFFICULTY URINATING: 0
WEAKNESS: 0
WHEEZING: 0
CHEST TIGHTNESS: 0
EYE DISCHARGE: 0
NAUSEA: 0
HEMATURIA: 0
PALPITATIONS: 0
SHORTNESS OF BREATH: 0

## 2025-07-10 NOTE — PROGRESS NOTES
Subjective      Patient ID: Sandra Albrecht is a 49 y.o. female.  1975      Patient annual exam.  Patient no new medical complaints today.  Patient teaches science at high school at the [employer]  She is .  She has 2 children the oldest is at [employer]  Patient typically has 3 meals per day.  She has a very good exercise program.  Sleep can be interrupted.  She is having some hot flashes.  Her menstrual cycles however continue to be irregular.  Patient ar on Zyrtec and Pepcid AC she has a diagnosis of eosinophilic esophagitis.  Patient strong family history of colon cancer gets regular colonoscopies  Patient does have episodes of Raynaud's phenomenon in the winter  Patient relates history of a tick bite approximately 3 and half weeks ago.  Patient did not develop rash or secondary symptoms.   has a past medical history of Eosinophilic esophagitis and Mastalgia (03/23/2021).    has a past surgical history that includes Upper gastrointestinal endoscopy (04/06/2018); Colonoscopy (10/01/2020); Upper gastrointestinal endoscopy (08/02/2021); Upper gastrointestinal endoscopy (N/A, 04/13/2023); and Colonoscopy (N/A, 11/27/2024).         The following have been reviewed and updated as appropriate in this visit:   Allergies  Meds  Problems  Fam Hx       Review of Systems   Constitutional:  Negative for activity change, appetite change and fever.   HENT:  Negative for congestion, postnasal drip, sinus pressure, sinus pain, sore throat and trouble swallowing.    Eyes:  Negative for discharge and visual disturbance.   Respiratory:  Negative for cough, chest tightness, shortness of breath and wheezing.    Cardiovascular:  Negative for chest pain and palpitations.   Gastrointestinal:  Negative for abdominal pain, constipation, diarrhea, nausea and vomiting.   Genitourinary:  Negative for difficulty urinating, frequency and hematuria.   Musculoskeletal:  Positive for arthralgias. Negative for  gait problem and joint swelling.   Skin:  Negative for rash.   Neurological:  Negative for dizziness, weakness and numbness.       Objective     Vitals:    07/10/25 1400   BP: 102/60   BP Location: Left upper arm   Patient Position: Sitting   Pulse: 73   Resp: 16   Temp: 36.3 °C (97.3 °F)   TempSrc: Temporal   SpO2: 98%   Weight: 51.7 kg (114 lb)   Height: 1.524 m (5')     Body mass index is 22.26 kg/m².    Physical Exam  Vitals reviewed.   Constitutional:       Appearance: She is well-developed.   HENT:      Head: Normocephalic and atraumatic.      Right Ear: Tympanic membrane, ear canal and external ear normal.      Left Ear: Tympanic membrane, ear canal and external ear normal.      Nose: Nose normal.      Mouth/Throat:      Mouth: Mucous membranes are moist.   Eyes:      Conjunctiva/sclera: Conjunctivae normal.      Pupils: Pupils are equal, round, and reactive to light.   Neck:      Thyroid: No thyromegaly.   Cardiovascular:      Rate and Rhythm: Normal rate and regular rhythm.      Pulses:           Dorsalis pedis pulses are 2+ on the right side and 2+ on the left side.        Posterior tibial pulses are 2+ on the right side and 2+ on the left side.      Heart sounds: Normal heart sounds.   Pulmonary:      Effort: Pulmonary effort is normal.      Breath sounds: Normal breath sounds. No wheezing.   Abdominal:      General: Bowel sounds are normal.      Palpations: Abdomen is soft. There is no mass.      Tenderness: There is no abdominal tenderness.   Musculoskeletal:         General: No tenderness. Normal range of motion.      Cervical back: Normal range of motion and neck supple.   Lymphadenopathy:      Cervical: No cervical adenopathy.   Skin:     General: Skin is warm and dry.      Findings: No rash.   Neurological:      Mental Status: She is alert and oriented to person, place, and time.   Psychiatric:         Behavior: Behavior normal.         Assessment & Plan  Encounter for general adult medical  examination without abnormal findings  Annual exam.  Daily routine discussed at length.  Continue 3 meals per day continue regular exercise program.  Patient continue limit caffeine and alcohol recommend strengthening x-rays  Orders:    Comprehensive metabolic panel; Future    Lipid panel; Future    CBC; Future    Urinalysis with microscopic; Future    TSH 3rd Generation; Future    Menorrhagia with regular cycle  Menorrhagia patient followed by gynecology.  Patient perimenopausal       Gastroesophageal reflux disease without esophagitis  Esophageal reflux well-controlled       Eosinophilic esophagitis  Eosinophilic esophagitis CONTROLLED       Raynaud's disease without gangrene  Raynaud's phenomenon.  Patient continue with conservative measures       Tick bite of lower leg, unspecified laterality, initial encounter  Tick bite will check Lyme  Orders:    Lyme Disease Antibodies (IgG, IgM),; Future

## 2025-07-25 ENCOUNTER — CLINICAL SUPPORT (OUTPATIENT)
Dept: FAMILY MEDICINE | Facility: CLINIC | Age: 50
End: 2025-07-25
Payer: COMMERCIAL

## 2025-07-25 DIAGNOSIS — S80.869A TICK BITE OF LOWER LEG, UNSPECIFIED LATERALITY, INITIAL ENCOUNTER: ICD-10-CM
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
## 2025-07-25 DIAGNOSIS — Z00.00 ENCOUNTER FOR GENERAL ADULT MEDICAL EXAMINATION WITHOUT ABNORMAL FINDINGS: ICD-10-CM

## 2025-07-26 LAB
ALBUMIN SERPL-MCNC: 4.3 G/DL (ref 3.9–4.9)
ALP SERPL-CCNC: 53 IU/L (ref 44–121)
ALT SERPL-CCNC: 9 IU/L (ref 0–32)
APPEARANCE UR: CLEAR
AST SERPL-CCNC: 20 IU/L (ref 0–40)
B BURGDOR IGG+IGM SER QL IA: NEGATIVE
BACTERIA #/AREA URNS HPF: NORMAL /[HPF]
BILIRUB SERPL-MCNC: 0.4 MG/DL (ref 0–1.2)
BILIRUB UR QL STRIP: NEGATIVE
BUN SERPL-MCNC: 14 MG/DL (ref 6–24)
BUN/CREAT SERPL: 21 (ref 9–23)
CALCIUM SERPL-MCNC: 9.1 MG/DL (ref 8.7–10.2)
CASTS URNS QL MICRO: NORMAL /LPF
CHLORIDE SERPL-SCNC: 102 MMOL/L (ref 96–106)
CHOLEST SERPL-MCNC: 260 MG/DL (ref 100–199)
CO2 SERPL-SCNC: 20 MMOL/L (ref 20–29)
COLOR UR: YELLOW
CREAT SERPL-MCNC: 0.66 MG/DL (ref 0.57–1)
EGFRCR SERPLBLD CKD-EPI 2021: 107 ML/MIN/1.73
EPI CELLS #/AREA URNS HPF: NORMAL /HPF (ref 0–10)
ERYTHROCYTE [DISTWIDTH] IN BLOOD BY AUTOMATED COUNT: 12.9 % (ref 11.7–15.4)
GLOBULIN SER CALC-MCNC: 2.2 G/DL (ref 1.5–4.5)
GLUCOSE SERPL-MCNC: 79 MG/DL (ref 70–99)
GLUCOSE UR QL STRIP: NEGATIVE
HCT VFR BLD AUTO: 40 % (ref 34–46.6)
HDLC SERPL-MCNC: 107 MG/DL
HGB BLD-MCNC: 13.1 G/DL (ref 11.1–15.9)
HGB UR QL STRIP: NEGATIVE
KETONES UR QL STRIP: NEGATIVE
LDLC SERPL CALC-MCNC: 143 MG/DL (ref 0–99)
LEUKOCYTE ESTERASE UR QL STRIP: ABNORMAL
MCH RBC QN AUTO: 30.1 PG (ref 26.6–33)
MCHC RBC AUTO-ENTMCNC: 32.8 G/DL (ref 31.5–35.7)
MCV RBC AUTO: 92 FL (ref 79–97)
MICRO URNS: ABNORMAL
NITRITE UR QL STRIP: NEGATIVE
PH UR STRIP: 7.5 [PH] (ref 5–7.5)
PLATELET # BLD AUTO: 224 X10E3/UL (ref 150–450)
POTASSIUM SERPL-SCNC: 4.8 MMOL/L (ref 3.5–5.2)
PROT SERPL-MCNC: 6.5 G/DL (ref 6–8.5)
PROT UR QL STRIP: NEGATIVE
RBC # BLD AUTO: 4.35 X10E6/UL (ref 3.77–5.28)
RBC #/AREA URNS HPF: NORMAL /HPF (ref 0–2)
SODIUM SERPL-SCNC: 136 MMOL/L (ref 134–144)
SP GR UR STRIP: 1.01 (ref 1–1.03)
TRIGL SERPL-MCNC: 62 MG/DL (ref 0–149)
TSH SERPL DL<=0.005 MIU/L-ACNC: 1.14 UIU/ML (ref 0.45–4.5)
UROBILINOGEN UR STRIP-MCNC: 0.2 MG/DL (ref 0.2–1)
VLDLC SERPL CALC-MCNC: 10 MG/DL (ref 5–40)
WBC # BLD AUTO: 5.2 X10E3/UL (ref 3.4–10.8)
WBC #/AREA URNS HPF: NORMAL /HPF (ref 0–5)

## 2025-08-05 ENCOUNTER — OFFICE VISIT (OUTPATIENT)
Dept: FAMILY MEDICINE | Facility: CLINIC | Age: 50
End: 2025-08-05
Payer: COMMERCIAL

## 2025-08-05 VITALS
SYSTOLIC BLOOD PRESSURE: 118 MMHG | RESPIRATION RATE: 16 BRPM | OXYGEN SATURATION: 99 % | DIASTOLIC BLOOD PRESSURE: 72 MMHG | WEIGHT: 114 LBS | TEMPERATURE: 97.5 F | HEIGHT: 60 IN | BODY MASS INDEX: 22.38 KG/M2 | HEART RATE: 72 BPM

## 2025-08-05 DIAGNOSIS — R10.32 LLQ PAIN: ICD-10-CM

## 2025-08-05 DIAGNOSIS — R35.0 URINARY FREQUENCY: Primary | ICD-10-CM

## 2025-08-05 PROCEDURE — 81002 URINALYSIS NONAUTO W/O SCOPE: CPT | Performed by: FAMILY MEDICINE

## 2025-08-05 PROCEDURE — 99214 OFFICE O/P EST MOD 30 MIN: CPT | Performed by: FAMILY MEDICINE

## 2025-08-05 PROCEDURE — 3008F BODY MASS INDEX DOCD: CPT | Performed by: FAMILY MEDICINE

## 2025-08-05 NOTE — PROGRESS NOTES
Subjective      Patient ID: Sandra Albrecht is a 49 y.o. female.    48 yo F here w/ recurrent UTI sx    Pt has been having these sx on and off since the Spring - several urines have come back neg cx  These sx have been ongoing on and off - will come on and off at different frequency  Current sx - burning in urethra but not w/ urination, cramps lower abdo, no vaginal d/c, no dysuria, no incr frequency or urgency, no change in color/smell to urine  Still has a period reg  H/o reflux- drinking a bit more selzter this summer than usual, avoid spicy foods, drinks 2 c coffee/day  No f/c, no n/v  No back/flank pain  Occas LUQ pain     Last saw GYN - about 1 yr ago, usu sees yearly, might have upcoming apptmt    UA dipstick results: colorless, clear  Spec grav -1.005  pH - 5  Leuk - neg   Nitrites - neg  Prot -trace  Gluc - nml  Ket - neg   UroBili - nml  Bili - neg  Bld - neg                    The following have been reviewed and updated as appropriate in this visit:   Allergies  Meds  Problems       Review of Systems    Objective     Vitals:    08/05/25 1728   BP: 118/72   BP Location: Left upper arm   Patient Position: Sitting   Pulse: 72   Resp: 16   Temp: 36.4 °C (97.5 °F)   TempSrc: Temporal   SpO2: 99%   Weight: 51.7 kg (114 lb)   Height: 1.524 m (5')     Body mass index is 22.26 kg/m².    Physical Exam  Vitals reviewed.   Constitutional:       Appearance: She is well-developed.   HENT:      Head: Normocephalic and atraumatic.      Right Ear: External ear normal.      Left Ear: External ear normal.      Mouth/Throat:      Pharynx: No oropharyngeal exudate.   Eyes:      Conjunctiva/sclera: Conjunctivae normal.      Pupils: Pupils are equal, round, and reactive to light.   Cardiovascular:      Rate and Rhythm: Normal rate.      Pulses:           Radial pulses are 2+ on the right side and 2+ on the left side.        Dorsalis pedis pulses are 2+ on the right side and 2+ on the left side.        Posterior tibial pulses  are 2+ on the right side and 2+ on the left side.      Heart sounds: Normal heart sounds. No murmur heard.  Pulmonary:      Effort: Pulmonary effort is normal.      Breath sounds: Normal breath sounds.   Abdominal:      General: Bowel sounds are normal. There is no distension.      Palpations: Abdomen is soft.      Tenderness: There is abdominal tenderness (LLQ). There is no right CVA tenderness, left CVA tenderness, guarding or rebound.   Musculoskeletal:      Cervical back: Normal range of motion and neck supple.      Right lower leg: No edema.      Left lower leg: No edema.   Lymphadenopathy:      Head:      Right side of head: No submental, submandibular, tonsillar, preauricular, posterior auricular or occipital adenopathy.      Left side of head: No submental, submandibular, tonsillar, preauricular, posterior auricular or occipital adenopathy.      Cervical: No cervical adenopathy.      Upper Body:      Right upper body: No supraclavicular adenopathy.      Left upper body: No supraclavicular adenopathy.   Neurological:      Mental Status: She is alert and oriented to person, place, and time.   Psychiatric:         Behavior: Behavior normal.         Thought Content: Thought content normal.         Judgment: Judgment normal.         Assessment/Plan       Diagnosis Plan   1. Urinary frequency  Urinalysis with Reflex Culture (ED and Outpatient only)    POCT urinalysis dipstick    Dip unremarkable - send for cx  Rec limit bladder irritants - caffeine, sparkling water, etc  Consider vaginal estrogen if no infection/sx persist - can d/w GYN      2. LLQ pain  US PELVIS TRANSABDOMINAL & TRANSVAGINAL    Check U/S - pt w/ h/o ovarian cyst        Pt v/u all instructions

## 2025-08-06 LAB
APPEARANCE UR: CLEAR
BACTERIA #/AREA URNS HPF: NORMAL /HPF
BACTERIA UR CULT: NORMAL
BILIRUB UR QL STRIP: NEGATIVE
COLOR UR: YELLOW
GLUCOSE UR QL STRIP: NEGATIVE
HGB UR QL STRIP: NEGATIVE
HYALINE CASTS #/AREA URNS LPF: NORMAL /LPF
KETONES UR QL STRIP: NEGATIVE
LEUKOCYTE ESTERASE UR QL STRIP: NEGATIVE
NITRITE UR QL STRIP: NEGATIVE
PH UR STRIP: 5.5 [PH] (ref 5–8)
PROT UR QL STRIP: NEGATIVE
RBC #/AREA URNS HPF: NORMAL /HPF
SERVICE CMNT-IMP: NORMAL
SP GR UR STRIP: 1.01 (ref 1–1.03)
SQUAMOUS #/AREA URNS HPF: NORMAL /HPF
WBC #/AREA URNS HPF: NORMAL /HPF

## 2025-08-08 ENCOUNTER — HOSPITAL ENCOUNTER (OUTPATIENT)
Dept: RADIOLOGY | Age: 50
Discharge: HOME | End: 2025-08-08
Attending: FAMILY MEDICINE
Payer: COMMERCIAL

## 2025-08-08 DIAGNOSIS — R10.32 LLQ PAIN: ICD-10-CM

## 2025-08-08 PROCEDURE — 76830 TRANSVAGINAL US NON-OB: CPT

## 2025-08-20 ENCOUNTER — TELEPHONE (OUTPATIENT)
Dept: FAMILY MEDICINE | Facility: CLINIC | Age: 50
End: 2025-08-20
Payer: COMMERCIAL

## 2025-08-22 ENCOUNTER — TELEPHONE (OUTPATIENT)
Dept: FAMILY MEDICINE | Facility: CLINIC | Age: 50
End: 2025-08-22
Payer: COMMERCIAL

## 2025-08-22 DIAGNOSIS — E78.00 PURE HYPERCHOLESTEROLEMIA: Primary | ICD-10-CM
